# Patient Record
Sex: FEMALE | Race: WHITE | HISPANIC OR LATINO | Employment: OTHER | URBAN - METROPOLITAN AREA
[De-identification: names, ages, dates, MRNs, and addresses within clinical notes are randomized per-mention and may not be internally consistent; named-entity substitution may affect disease eponyms.]

---

## 2017-05-11 ENCOUNTER — OFFICE VISIT (OUTPATIENT)
Dept: MEDICAL GROUP | Age: 57
End: 2017-05-11
Payer: COMMERCIAL

## 2017-05-11 VITALS
DIASTOLIC BLOOD PRESSURE: 86 MMHG | WEIGHT: 139 LBS | OXYGEN SATURATION: 97 % | BODY MASS INDEX: 23.73 KG/M2 | TEMPERATURE: 98.1 F | HEART RATE: 69 BPM | HEIGHT: 64 IN | SYSTOLIC BLOOD PRESSURE: 122 MMHG

## 2017-05-11 DIAGNOSIS — G44.209 TENSION HEADACHE: ICD-10-CM

## 2017-05-11 DIAGNOSIS — M54.50 ACUTE BILATERAL LOW BACK PAIN WITHOUT SCIATICA: ICD-10-CM
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
## 2017-05-11 DIAGNOSIS — M62.838 NECK MUSCLE SPASM: ICD-10-CM

## 2017-05-11 PROCEDURE — 99214 OFFICE O/P EST MOD 30 MIN: CPT | Mod: 25 | Performed by: PHYSICIAN ASSISTANT

## 2017-05-11 RX ORDER — NAPROXEN 500 MG/1
500 TABLET ORAL 2 TIMES DAILY WITH MEALS
Qty: 60 TAB | Refills: 0 | Status: SHIPPED | OUTPATIENT
Start: 2017-05-11 | End: 2017-06-27 | Stop reason: SDUPTHER

## 2017-05-11 RX ORDER — KETOROLAC TROMETHAMINE 30 MG/ML
30 INJECTION, SOLUTION INTRAMUSCULAR; INTRAVENOUS ONCE
Status: COMPLETED | OUTPATIENT
Start: 2017-05-11 | End: 2017-05-11

## 2017-05-11 RX ORDER — CYCLOBENZAPRINE HCL 5 MG
5-10 TABLET ORAL 3 TIMES DAILY PRN
Qty: 30 TAB | Refills: 0 | Status: SHIPPED | OUTPATIENT
Start: 2017-05-11 | End: 2017-06-27 | Stop reason: SDUPTHER

## 2017-05-11 RX ADMIN — KETOROLAC TROMETHAMINE 30 MG: 30 INJECTION, SOLUTION INTRAMUSCULAR; INTRAVENOUS at 09:46

## 2017-05-11 ASSESSMENT — ENCOUNTER SYMPTOMS
NECK PAIN: 1
BACK PAIN: 1
HEADACHES: 1

## 2017-05-11 ASSESSMENT — PATIENT HEALTH QUESTIONNAIRE - PHQ9: CLINICAL INTERPRETATION OF PHQ2 SCORE: 1

## 2017-05-11 NOTE — Clinical Note
[facility]  Omaira Gonsalves PA-C  [address]  [place] [address]  Fax: [phone]   Authorization for Release/Disclosure of   Protected Health Information   Name: JOVITA TRENT : 1960 SSN: [id number]   Address: [address] [place] [address] Phone:    [phone] (home)    I authorize the entity listed below to release/disclose the PHI below to:   [facility]/Omaira Gonsalves PA-C and Omaira Gonsalves PA-C   Provider or Entity Name:  [facility], [facility]   Address   City, State, [facility]   Phone:      Fax:  [phone]     Reason for request: continuity of care   Information to be released:    [  x] LAST COLONOSCOPY,  including any PATH REPORT and follow-up  [  ] LAST FIT/COLOGUARD RESULT [  ] LAST DEXA  [  ] LAST MAMMOGRAM  [  ] LAST PAP  [  ] LAST LABS [  ] RETINA EXAM REPORT  [  ] IMMUNIZATION RECORDS  [  ] Release all info      [  ] Check here and initial the line next to each item to release ALL health information INCLUDING  _____ Care and treatment for drug and / or alcohol abuse  _____ HIV testing, infection status, or AIDS  _____ Genetic Testing    DATES OF SERVICE OR TIME PERIOD TO BE DISCLOSED: _____________  I understand and acknowledge that:  * This Authorization may be revoked at any time by you in writing, except if your health information has already been used or disclosed.  * Your health information that will be used or disclosed as a result of you signing this authorization could be re-disclosed by the recipient. If this occurs, your re-disclosed health information may no longer be protected by State or Federal laws.  * You may refuse to sign this Authorization. Your refusal will not affect your ability to obtain treatment.  * This Authorization becomes effective upon signing and will  on (date) __________.      If no date is indicated, this Authorization will  one (1) year from the signature date.    Name: Jovita Trent    Signature:   Date:     2017       PLEASE FAX REQUESTED RECORDS  BACK TO: [phone]
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
## 2017-05-23 DIAGNOSIS — Z13.0 SCREENING FOR ENDOCRINE, METABOLIC AND IMMUNITY DISORDER: ICD-10-CM

## 2017-05-23 DIAGNOSIS — Z13.220 LIPID SCREENING: ICD-10-CM

## 2017-05-23 DIAGNOSIS — J44.9 CHRONIC OBSTRUCTIVE PULMONARY DISEASE, UNSPECIFIED COPD TYPE (HCC): ICD-10-CM

## 2017-05-23 DIAGNOSIS — Z12.31 ENCOUNTER FOR SCREENING MAMMOGRAM FOR BREAST CANCER: ICD-10-CM

## 2017-05-23 DIAGNOSIS — Z13.29 SCREENING FOR ENDOCRINE, METABOLIC AND IMMUNITY DISORDER: ICD-10-CM

## 2017-05-23 DIAGNOSIS — Z13.1 SCREENING FOR DIABETES MELLITUS: ICD-10-CM

## 2017-05-23 DIAGNOSIS — Z13.228 SCREENING FOR ENDOCRINE, METABOLIC AND IMMUNITY DISORDER: ICD-10-CM

## 2017-06-27 ENCOUNTER — OFFICE VISIT (OUTPATIENT)
Dept: MEDICAL GROUP | Age: 57
End: 2017-06-27
Payer: COMMERCIAL

## 2017-06-27 VITALS
SYSTOLIC BLOOD PRESSURE: 112 MMHG | TEMPERATURE: 97.7 F | HEIGHT: 64 IN | BODY MASS INDEX: 23.32 KG/M2 | WEIGHT: 136.6 LBS | HEART RATE: 67 BPM | OXYGEN SATURATION: 98 % | DIASTOLIC BLOOD PRESSURE: 62 MMHG

## 2017-06-27 DIAGNOSIS — M62.838 NECK MUSCLE SPASM: ICD-10-CM

## 2017-06-27 DIAGNOSIS — M54.50 ACUTE BILATERAL LOW BACK PAIN WITHOUT SCIATICA: ICD-10-CM

## 2017-06-27 DIAGNOSIS — G44.209 TENSION HEADACHE: ICD-10-CM
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
## 2017-06-27 PROCEDURE — 99214 OFFICE O/P EST MOD 30 MIN: CPT | Performed by: PHYSICIAN ASSISTANT

## 2017-06-27 RX ORDER — NAPROXEN 500 MG/1
500 TABLET ORAL 2 TIMES DAILY WITH MEALS
Qty: 60 TAB | Refills: 0 | Status: SHIPPED | OUTPATIENT
Start: 2017-06-27 | End: 2018-03-19

## 2017-06-27 RX ORDER — CYCLOBENZAPRINE HCL 5 MG
5-10 TABLET ORAL 3 TIMES DAILY PRN
Qty: 30 TAB | Refills: 0 | Status: SHIPPED | OUTPATIENT
Start: 2017-06-27 | End: 2018-03-19
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
## 2017-07-10 ENCOUNTER — OFFICE VISIT (OUTPATIENT)
Dept: MEDICAL GROUP | Age: 57
End: 2017-07-10
Payer: COMMERCIAL

## 2017-07-10 VITALS
HEART RATE: 70 BPM | DIASTOLIC BLOOD PRESSURE: 80 MMHG | BODY MASS INDEX: 23.49 KG/M2 | TEMPERATURE: 98.3 F | SYSTOLIC BLOOD PRESSURE: 120 MMHG | WEIGHT: 137.6 LBS | OXYGEN SATURATION: 96 % | HEIGHT: 64 IN

## 2017-07-10 DIAGNOSIS — E78.5 DYSLIPIDEMIA: ICD-10-CM

## 2017-07-10 DIAGNOSIS — Z23 NEED FOR VACCINATION: ICD-10-CM

## 2017-07-10 DIAGNOSIS — J44.9 CHRONIC OBSTRUCTIVE PULMONARY DISEASE, UNSPECIFIED COPD TYPE (HCC): ICD-10-CM

## 2017-07-10 PROCEDURE — 90715 TDAP VACCINE 7 YRS/> IM: CPT | Performed by: PHYSICIAN ASSISTANT

## 2017-07-10 PROCEDURE — 99214 OFFICE O/P EST MOD 30 MIN: CPT | Mod: 25 | Performed by: PHYSICIAN ASSISTANT

## 2017-07-10 PROCEDURE — 90732 PPSV23 VACC 2 YRS+ SUBQ/IM: CPT | Performed by: PHYSICIAN ASSISTANT

## 2017-07-10 PROCEDURE — 90471 IMMUNIZATION ADMIN: CPT | Performed by: PHYSICIAN ASSISTANT

## 2017-07-10 PROCEDURE — 90472 IMMUNIZATION ADMIN EACH ADD: CPT | Performed by: PHYSICIAN ASSISTANT

## 2017-07-10 NOTE — PROGRESS NOTES
Subjective:     Chief Complaint   Patient presents with   • Hyperlipidemia     Results for blood work     Rylie Trent is a 57 y.o. female here today for evaluation and management of:    Chronic obstructive pulmonary disease, unspecified COPD type (CMS-HCC)  Stable. Not currently using albuterol.   She denies side effects with its use in the past.  Denies recent or current exacerbation.   Denies current shortness of breath, chest pain, or cough.  She is not on oxygen therapy.  Last PFT: 2015-- suggestive of mild obstructive disease    Dyslipidemia  Reports diet is fair but needs more veggies. Reports high in carbs.  She isexercising regularly-walking a few days a week. Plans to increase  She is not taking ASA daily.  She denies dizziness, claudication, or chest pain.    Vaginal dryness--reports resolved. Under care of GYN.      ROS   Denies any recent fevers or chills. No nausea or vomiting. No diarrhea. No chest pains or shortness of breath. No lower extremity edema.    No Known Allergies    Current medicines (including changes today)  Current Outpatient Prescriptions   Medication Sig Dispense Refill   • naproxen (NAPROSYN) 500 MG Tab Take 1 Tab by mouth 2 times a day, with meals. 60 Tab 0   • Acetaminophen (TYLENOL ARTHRITIS PAIN PO) Take  by mouth.     • CALCIUM PO Take 600 mg by mouth every day.     • albuterol (PROAIR HFA) 108 (90 BASE) MCG/ACT AERS inhalation aerosol Inhale 2 Puffs by mouth every four hours as needed for Shortness of Breath. 1 Inhaler 0   • Multiple Vitamins-Minerals (HAIR/SKIN/NAILS PO) Take 1 Cap by mouth every day.     • Multiple Vitamins-Minerals (MULTIVITAL PO) Take  by mouth.     • ascorbic acid (SM VIT C/BARBARA HIPS) 1000 MG tablet Take 1 Tab by mouth every day.     • vitamin E (VITAMIN E) 1000 UNIT CAPS Take 1 Cap by mouth every day. For skin      • cyclobenzaprine (FLEXERIL) 5 MG tablet Take 1-2 Tabs by mouth 3 times a day as needed for Muscle Spasms. (Patient not taking: Reported on  "7/10/2017) 30 Tab 0   • GARLIC Take 1,000 Caps by mouth every day.       No current facility-administered medications for this visit.       Patient Active Problem List    Diagnosis Date Noted   • Dyslipidemia 07/10/2017   • Chronic obstructive lung disease (CMS-HCC) 07/15/2015   • Menopausal vaginal dryness 03/18/2015   • Perimenopausal symptoms 07/17/2012       Family History   Problem Relation Age of Onset   • Cancer Mother      pancreatic   • Cancer Father      liver   • Diabetes Neg Hx    • Hypertension Neg Hx    • Heart Disease Neg Hx    • Stroke Neg Hx    • Lung Disease Paternal Grandmother      asthma          Objective:     Blood pressure 120/80, pulse 70, temperature 36.8 °C (98.3 °F), height 1.626 m (5' 4\"), weight 62.415 kg (137 lb 9.6 oz), SpO2 96 %. Body mass index is 23.61 kg/(m^2).     Physical Exam:  Gen: Well developed, well nourished in no acute distress.   Skin: Pink, warm, and dry  HEENT: conjunctiva non-injected, sclera non-icteric. EOMs intact.   Oral mucous membranes pink and moist with no lesions.  Neck: Supple, trachea midline. No adenopathy or masses in the neck or supraclavicular regions.  Lungs: Effort is normal. Clear to auscultation bilaterally with good excursion.  CV: regular rate and rhythm.  Abdomen: soft, nontender, + BS. No HSM.   Ext: no edema, color normal, vascularity normal, temperature normal  Alert and oriented Eye contact is good, speech goal directed, affect calm    [facility] labs scanned into media from 5/25/17 reviewed with patient:  Glucose 93  Total cholesterol 232; Triglycerides 114; HDL 45;   TSH 1.30  Reviewed and discussed above labs with patient in office.      Assessment and Plan:   The following treatment plan was discussed:     1. Chronic obstructive pulmonary disease, unspecified COPD type (CMS-HCC) - stable. She will start albuterol use as needed for dyspnea symptoms. If using more than once weekly, she will let me know and we will consider adding spiriva. " Will consider PFT in future, however, will hold off at this time due to patient's concerns with cost.  CBC WITH DIFFERENTIAL   2. Dyslipidemia - uncontrolled. Pt not interested in prescription therapy at this time. Will work on diet modification.  is taking cholestoff and fish oil currently and would like to also try. We will recheck labs in 6 months. No family hx of CV disease.  COMP METABOLIC PANEL    LIPID PROFILE   3. Need for vaccination -VIS' given. Informed consent obtained. Vaccines administered in office. TDAP VACCINE =>6YO IM    Pneumococal Polysaccharide Vaccine 23-Valent =>1yo SQ/IM     - HM: Reminded to complete mammo.   -Any change or worsening of signs or symptoms, patient encouraged to follow-up or report to emergency room for further evaluation. Patient verbalizes understanding and agrees.    Followup: Return in about 6 months (around 1/10/2018) for lab review. Sooner if needed.          Patient was seen for 25 minutes face to face of which > 50% of appointment time was spent on counseling and coordination of care regarding the above.

## 2017-07-10 NOTE — MR AVS SNAPSHOT
"        Rylie Scales   7/10/2017 1:50 PM   Office Visit   MRN: [id number]    Department:  [address] [place] [address]   Dept Phone:  [phone]    Description:  Female : 1960   Provider:  Omaiar Gonsalves PA-C           Reason for Visit     Hyperlipidemia Results for blood work      Allergies as of 7/10/2017     No Known Allergies      You were diagnosed with     Chronic obstructive pulmonary disease, unspecified COPD type (CMS-HCC)   [0049518]       Dyslipidemia   [762098]       Need for vaccination   [020444]         Vital Signs     Blood Pressure Pulse Temperature Height Weight Body Mass Index    120/80 mmHg 70 36.8 °C (98.3 °F) 1.626 m (5' 4\") 62.415 kg (137 lb 9.6 oz) 23.61 kg/m2    Oxygen Saturation Smoking Status                96% Never Smoker           Basic Information     Date Of Birth Sex Race Ethnicity Preferred Language    1960 Female White Non- English      Your appointments     Say 15, 2018  1:10 PM   Established Patient with Omaira Gonsalves PA-C   [address] [facility]  [facility] [id number] [phone]           You will be receiving a confirmation call a few days before your appointment from our automated call confirmation system.              Problem List              ICD-10-CM Priority Class Noted - Resolved    Perimenopausal symptoms N95.1   2012 - Present    Menopausal vaginal dryness N95.1   3/18/2015 - Present    Chronic obstructive lung disease (CMS-HCC) J44.9   7/15/2015 - Present    Dyslipidemia E78.5   7/10/2017 - Present      Health Maintenance        Date Due Completion Dates    IMM DTaP/Tdap/Td Vaccine (1 - Tdap) 6/10/1978 ---    IMM PNEUMOCOCCAL 19-64 (ADULT) MEDIUM RISK SERIES (1 of 1 - PPSV23) 6/10/1978 ---    MAMMOGRAM 2015, 2007, 2007, 2005    PAP SMEAR 2017, 7/15/2011, 2010    IMM INFLUENZA (1) 2016, 2015    COLONOSCOPY 2026            "   Current Immunizations     Influenza Vaccine Quad Inj (Pf) 11/14/2016  3:38 PM, 11/9/2015  1:20 PM    Pneumococcal polysaccharide vaccine (PPSV-23)  Incomplete    Tdap Vaccine  Incomplete      Below and/or attached are the medications your provider expects you to take. Review all of your home medications and newly ordered medications with your provider and/or pharmacist. Follow medication instructions as directed by your provider and/or pharmacist. Please keep your medication list with you and share with your provider. Update the information when medications are discontinued, doses are changed, or new medications (including over-the-counter products) are added; and carry medication information at all times in the event of emergency situations     Allergies:  No Known Allergies          Medications  Valid as of: July 10, 2017 -  2:50 PM    Generic Name Brand Name Tablet Size Instructions for use    Acetaminophen   Take  by mouth.        Albuterol Sulfate (Aero Soln) albuterol 108 (90 BASE) MCG/ACT Inhale 2 Puffs by mouth every four hours as needed for Shortness of Breath.        Ascorbic Acid (Tab) VITAMIN C 1000 MG Take 1 Tab by mouth every day.        Calcium   Take 600 mg by mouth every day.        Cyclobenzaprine HCl (Tab) FLEXERIL 5 MG Take 1-2 Tabs by mouth 3 times a day as needed for Muscle Spasms.        Garlic   Take 1,000 Caps by mouth every day.        Multiple Vitamins-Minerals   Take  by mouth.        Multiple Vitamins-Minerals   Take 1 Cap by mouth every day.        Naproxen (Tab) NAPROSYN 500 MG Take 1 Tab by mouth 2 times a day, with meals.        Vitamin E (Cap) VITAMIN E 1000 UNIT Take 1 Cap by mouth every day. For skin         .                 Medicines prescribed today were sent to:     [facility] [address] [place] [address] - [address] [place] [id number]    Phone: [phone] Fax: [phone]    Open 24 Hours?: No      Medication refill instructions:       If  your prescription bottle indicates you have medication refills left, it is not necessary to call your provider’s office. Please contact your pharmacy and they will refill your medication.    If your prescription bottle indicates you do not have any refills left, you may request refills at any time through one of the following ways: The online Smart[employer] system (except Urgent Care), by calling your provider’s office, or by asking your pharmacy to contact your provider’s office with a refill request. Medication refills are processed only during regular business hours and may not be available until the next business day. Your provider may request additional information or to have a follow-up visit with you prior to refilling your medication.   *Please Note: Medication refills are assigned a new Rx number when refilled electronically. Your pharmacy may indicate that no refills were authorized even though a new prescription for the same medication is available at the pharmacy. Please request the medicine by name with the pharmacy before contacting your provider for a refill.        Your To Do List     Future Labs/Procedures Complete By Expires    CBC WITH DIFFERENTIAL  As directed 7/11/2018    COMP METABOLIC PANEL  As directed 7/11/2018    LIPID PROFILE  As directed 7/11/2018         Smarty Ring Access Code: Activation code not generated  Current Smarty Ring Status: Active

## 2017-07-31 ENCOUNTER — TELEPHONE (OUTPATIENT)
Dept: MEDICAL GROUP | Age: 57
End: 2017-07-31

## 2017-08-01 NOTE — TELEPHONE ENCOUNTER
Phone Number Called: [facility] [phone]    Message: I received a message from Cate CARO Stating that [facility] needs an updated referral. I have called and left a message with [facility] to get more information.    Left Message for patient to call back: yes

## 2018-01-15 ENCOUNTER — OFFICE VISIT (OUTPATIENT)
Dept: MEDICAL GROUP | Age: 58
End: 2018-01-15
Payer: COMMERCIAL

## 2018-01-15 VITALS
SYSTOLIC BLOOD PRESSURE: 122 MMHG | WEIGHT: 143 LBS | TEMPERATURE: 98.3 F | OXYGEN SATURATION: 97 % | DIASTOLIC BLOOD PRESSURE: 72 MMHG | HEART RATE: 82 BPM | HEIGHT: 64 IN | BODY MASS INDEX: 24.41 KG/M2

## 2018-01-15 DIAGNOSIS — Z23 NEED FOR VACCINATION: ICD-10-CM

## 2018-01-15 DIAGNOSIS — E78.5 DYSLIPIDEMIA: ICD-10-CM

## 2018-01-15 PROCEDURE — 99213 OFFICE O/P EST LOW 20 MIN: CPT | Mod: 25 | Performed by: PHYSICIAN ASSISTANT

## 2018-01-15 PROCEDURE — 90686 IIV4 VACC NO PRSV 0.5 ML IM: CPT | Performed by: PHYSICIAN ASSISTANT

## 2018-01-15 PROCEDURE — 90471 IMMUNIZATION ADMIN: CPT | Performed by: PHYSICIAN ASSISTANT

## 2018-01-15 RX ORDER — ATORVASTATIN CALCIUM 20 MG/1
20 TABLET, FILM COATED ORAL
Qty: 90 TAB | Refills: 1 | Status: SHIPPED | OUTPATIENT
Start: 2018-01-15 | End: 2018-10-30 | Stop reason: SDUPTHER

## 2018-01-15 NOTE — LETTER
[facility]  Omaira Gonsalves P.A.-C.  [address]  [place] [address]  Fax: [phone]   Authorization for Release/Disclosure of   Protected Health Information   Name: JOVITA TRENT : 1960 SSN: [id number]   Address: [facility] [place] [address] Phone:    [phone] (home)    I authorize the entity listed below to release/disclose the PHI below to:   [facility]/Omaira Gonsalves P.A.-C. and Omaira Gonsalves P.A.-C.   Provider or Entity Name:  Mike Hearn M.D.   Address   City, State, Zip   Phone:  [phone]    Fax:  [phone]   Reason for request: continuity of care   Information to be released:    [  ] LAST COLONOSCOPY,  including any PATH REPORT and follow-up  [  ] LAST FIT/COLOGUARD RESULT [  ] LAST DEXA  [  ] LAST MAMMOGRAM  [ xxx ] LAST PAP  [  ] LAST LABS [  ] RETINA EXAM REPORT  [  ] IMMUNIZATION RECORDS  [  ] Release all info      [  ] Check here and initial the line next to each item to release ALL health information INCLUDING  _____ Care and treatment for drug and / or alcohol abuse  _____ HIV testing, infection status, or AIDS  _____ Genetic Testing    DATES OF SERVICE OR TIME PERIOD TO BE DISCLOSED: _____________  I understand and acknowledge that:  * This Authorization may be revoked at any time by you in writing, except if your health information has already been used or disclosed.  * Your health information that will be used or disclosed as a result of you signing this authorization could be re-disclosed by the recipient. If this occurs, your re-disclosed health information may no longer be protected by State or Federal laws.  * You may refuse to sign this Authorization. Your refusal will not affect your ability to obtain treatment.  * This Authorization becomes effective upon signing and will  on (date) __________.      If no date is indicated, this Authorization will  one (1) year from the signature date.    Name: Jovita Trent    Signature:   Date:          1/15/2018       PLEASE FAX REQUESTED RECORDS BACK TO: [phone]

## 2018-01-20 NOTE — PROGRESS NOTES
Subjective:     Chief Complaint   Patient presents with   • COPD   • Results     Rylie Trent is a 57 y.o. female here today for evaluation and management of:    Dyslipidemia  Stable. Currently taking cholestoff over the counter as directed.  Denies side effects--no myalgias or abdominal pain.  Reports diet is fair to poor.  She is not exercising regularly.  She is not taking ASA daily.  She denies dizziness, claudication, or chest pain.      ROS   Denies any recent fevers or chills. No nausea or vomiting. No diarrhea. No chest pains or shortness of breath. No lower extremity edema.    No Known Allergies    Current medicines (including changes today)  Current Outpatient Prescriptions   Medication Sig Dispense Refill   • Nutritional Supplements (WOMENS HEALTH SUPPORT PO) Take  by mouth.     • atorvastatin (LIPITOR) 20 MG Tab Take 1 Tab by mouth every bedtime. 90 Tab 1   • aspirin EC (ECOTRIN) 81 MG Tablet Delayed Response Take 1 Tab by mouth every day. 30 Tab    • cyclobenzaprine (FLEXERIL) 5 MG tablet Take 1-2 Tabs by mouth 3 times a day as needed for Muscle Spasms. 30 Tab 0   • Acetaminophen (TYLENOL ARTHRITIS PAIN PO) Take  by mouth.     • CALCIUM PO Take 600 mg by mouth every day.     • albuterol (PROAIR HFA) 108 (90 BASE) MCG/ACT AERS inhalation aerosol Inhale 2 Puffs by mouth every four hours as needed for Shortness of Breath. 1 Inhaler 0   • Multiple Vitamins-Minerals (MULTIVITAL PO) Take  by mouth.     • naproxen (NAPROSYN) 500 MG Tab Take 1 Tab by mouth 2 times a day, with meals. (Patient not taking: Reported on 1/15/2018) 60 Tab 0   • Multiple Vitamins-Minerals (HAIR/SKIN/NAILS PO) Take 1 Cap by mouth every day.     • GARLIC Take 1,000 Caps by mouth every day.     • ascorbic acid (SM VIT C/BARBARA HIPS) 1000 MG tablet Take 1 Tab by mouth every day.     • vitamin E (VITAMIN E) 1000 UNIT CAPS Take 1 Cap by mouth every day. For skin        No current facility-administered medications for this visit.   "      Patient Active Problem List    Diagnosis Date Noted   • Dyslipidemia 07/10/2017   • Chronic obstructive lung disease (CMS-HCC) 07/15/2015   • Perimenopausal symptoms 07/17/2012       Family History   Problem Relation Age of Onset   • Cancer Mother      pancreatic   • Cancer Father      liver   • Lung Disease Paternal Grandmother      asthma   • Diabetes Neg Hx    • Hypertension Neg Hx    • Heart Disease Neg Hx    • Stroke Neg Hx           Objective:     Vitals:    01/15/18 1316   BP: 122/72   Pulse: 82   Temp: 36.8 °C (98.3 °F)   SpO2: 97%   Weight: 64.9 kg (143 lb)   Height: 1.619 m (5' 3.75\")     Body mass index is 24.74 kg/m².     Physical Exam:  Gen: Well developed, well nourished in no acute distress.   Skin: Pink, warm, and dry  HEENT: conjunctiva non-injected, sclera non-icteric. EOMs intact.   Neck: Supple, trachea midline. No adenopathy or masses in the neck or supraclavicular regions. No carotid bruits.  Lungs: Effort is normal. Clear to auscultation bilaterally with good excursion.  CV: regular rate and rhythm.  Abdomen: soft, nontender, + BS. No HSM.  Ext: no edema, color normal, vascularity normal, temperature normal  Alert and oriented Eye contact is good, speech goal directed, affect calm    [facility] labs scanned into media from 1/2/18  Total  Cholesterol 251; TGs 161; HDL 57;   Discussed with patient in office      Assessment and Plan:   The following treatment plan was discussed:     1. Dyslipidemia - uncontrolled. Rx: Lipitor 20 mg nightly. Discussed potential side effects of medication with patient.  Recheck labs in 6 weeks. Advised to start taking 81 mg of ASA daily.    atorvastatin (LIPITOR) 20 MG Tab    COMP METABOLIC PANEL    LIPID PROFILE    aspirin EC (ECOTRIN) 81 MG Tablet Delayed Response   2. Need for vaccination -VIS given. Informed consent obtained. Vaccine administered in office. INFLUENZA VACCINE QUAD INJ >3Y(PF)       -Any change or worsening of signs or symptoms, patient " encouraged to follow-up or report to emergency room for further evaluation. Patient verbalizes understanding and agrees.    Followup: Return in about 2 months (around 3/15/2018) for lab review and follow-up on lipitor. sooner if needed

## 2018-02-08 ENCOUNTER — TELEPHONE (OUTPATIENT)
Dept: MEDICAL GROUP | Age: 58
End: 2018-02-08

## 2018-02-08 NOTE — TELEPHONE ENCOUNTER
----- Message from Omaira Gonsalves P.A.-C. sent at 1/20/2018  6:14 AM PST -----  Obtain last pap from 2016.

## 2018-02-08 NOTE — TELEPHONE ENCOUNTER
Phone Number Called: Dr. Hearn    Message: Office does not have any pap smear results for 2016. Last one was from 2015.    Left Message for patient to call back: no

## 2018-03-19 ENCOUNTER — OFFICE VISIT (OUTPATIENT)
Dept: MEDICAL GROUP | Age: 58
End: 2018-03-19
Payer: COMMERCIAL

## 2018-03-19 VITALS
HEIGHT: 64 IN | BODY MASS INDEX: 23.73 KG/M2 | HEART RATE: 75 BPM | DIASTOLIC BLOOD PRESSURE: 76 MMHG | WEIGHT: 139 LBS | RESPIRATION RATE: 16 BRPM | OXYGEN SATURATION: 95 % | TEMPERATURE: 98.2 F | SYSTOLIC BLOOD PRESSURE: 120 MMHG

## 2018-03-19 DIAGNOSIS — E78.5 DYSLIPIDEMIA: ICD-10-CM

## 2018-03-19 DIAGNOSIS — R59.1 LYMPHADENOPATHY: ICD-10-CM

## 2018-03-19 PROCEDURE — 99214 OFFICE O/P EST MOD 30 MIN: CPT | Performed by: PHYSICIAN ASSISTANT

## 2018-03-19 ASSESSMENT — PAIN SCALES - GENERAL: PAINLEVEL: NO PAIN

## 2018-03-19 NOTE — PROGRESS NOTES
Subjective:     Chief Complaint   Patient presents with   • Follow-Up     dyslipidema      Rylie Trent is a 57 y.o. female here today for evaluation and management of:    Dyslipidemia  Stable. Currently taking lipitor 20 mg nightly as directed.  Denies side effects--no myalgias or abdominal pain.  Reports diet is has improved and is avoiding breads, pastas and races.  She is exercising regularly through nightly walk with her .  She is taking ASA daily.  She denies dizziness, claudication, or chest pain.      Lymphadenopathy  New problem.  Reports about 2 weeks ago her friend noticed some mass on her chest.  Denies any changes in the size of the mass.  It is not tender.  No fever, chills, bodyaches night sweats or unexplained weight loss.  She feels well and without any recent illnesses.    ROS   Denies any recent fevers or chills. No nausea or vomiting. No diarrhea. No chest pains or shortness of breath. No lower extremity edema.    No Known Allergies    Current medicines (including changes today)  Current Outpatient Prescriptions   Medication Sig Dispense Refill   • Nutritional Supplements (WOMENS HEALTH SUPPORT PO) Take  by mouth.     • atorvastatin (LIPITOR) 20 MG Tab Take 1 Tab by mouth every bedtime. 90 Tab 1   • aspirin EC (ECOTRIN) 81 MG Tablet Delayed Response Take 1 Tab by mouth every day. 30 Tab    • Acetaminophen (TYLENOL ARTHRITIS PAIN PO) Take  by mouth.     • CALCIUM PO Take 600 mg by mouth every day.     • albuterol (PROAIR HFA) 108 (90 BASE) MCG/ACT AERS inhalation aerosol Inhale 2 Puffs by mouth every four hours as needed for Shortness of Breath. 1 Inhaler 0   • Multiple Vitamins-Minerals (HAIR/SKIN/NAILS PO) Take 1 Cap by mouth every day.     • Multiple Vitamins-Minerals (MULTIVITAL PO) Take  by mouth.     • GARLIC Take 1,000 Caps by mouth every day.     • ascorbic acid (SM VIT C/BARBARA HIPS) 1000 MG tablet Take 1 Tab by mouth every day.     • vitamin E (VITAMIN E) 1000 UNIT CAPS Take 1 Cap  "by mouth every day. For skin        No current facility-administered medications for this visit.        Patient Active Problem List    Diagnosis Date Noted   • Dyslipidemia 07/10/2017   • Perimenopausal symptoms 07/17/2012       Family History   Problem Relation Age of Onset   • Cancer Mother      pancreatic   • Cancer Father      liver   • Lung Disease Paternal Grandmother      asthma   • Diabetes Neg Hx    • Hypertension Neg Hx    • Heart Disease Neg Hx    • Stroke Neg Hx           Objective:     Vitals:    03/19/18 1300   BP: 120/76   Pulse: 75   Resp: 16   Temp: 36.8 °C (98.2 °F)   SpO2: 95%   Weight: 63 kg (139 lb)   Height: 1.619 m (5' 3.75\")     Body mass index is 24.05 kg/m².     Physical Exam:  Gen: Well developed, well nourished in no acute distress.   Skin: Pink, warm, and dry  HEENT: conjunctiva non-injected, sclera non-icteric. EOMs intact.   Nasal mucosa without edema nor erythema. No facial tenderness  Pinna normal. TM pearly gray.   Oral mucous membranes pink and moist with no lesions.  Neck: Supple, trachea midline. No adenopathy or masses in the neck.  Soft, mobile 5 mm diameter subcutaneous mass left supraclavicular region.  Lungs: Effort is normal. Clear to auscultation bilaterally with good excursion.  CV: regular rate and rhythm.  Abdomen: soft, nontender, + BS. No HSM.    Ext: no edema, color normal, vascularity normal, temperature normal  Alert and oriented Eye contact is good, speech goal directed, affect calm    Reviewed [facility] labs from 2/23/18 scanned into media with patient  Total cholesterol 132; HDL 56; triglycerides 70;       Assessment and Plan:   The following treatment plan was discussed:     1. Dyslipidemia -Stable. Continue current medicines. Monitor labs regularly-- 6mos COMP METABOLIC PANEL    LIPID PROFILE   2. Lymphadenopathy - US ordered for further evaluation. CBC also ordered.  Instructed her to not touch it and reassess in 1 week. US-SOFT TISSUES OF HEAD - NECK    CBC WITH " DIFFERENTIAL     - HM: Health maintenance is up-to-date  -Any change or worsening of signs or symptoms, patient encouraged to follow-up or report to emergency room for further evaluation. Patient verbalizes understanding and agrees.    Followup: Return in about 6 months (around 9/19/2018) for lab review, sooner if needed.         This dictation was created using voice recognition software. The accuracy of the dictation is limited to the abilities of the software. I expect there may be some errors of grammar and possibly content.

## 2018-04-02 ENCOUNTER — HOSPITAL ENCOUNTER (OUTPATIENT)
Dept: RADIOLOGY | Facility: MEDICAL CENTER | Age: 58
End: 2018-04-02
Attending: PHYSICIAN ASSISTANT
Payer: COMMERCIAL

## 2018-04-02 DIAGNOSIS — R59.1 LYMPHADENOPATHY: ICD-10-CM

## 2018-04-02 PROCEDURE — 76536 US EXAM OF HEAD AND NECK: CPT

## 2018-09-11 DIAGNOSIS — E78.5 DYSLIPIDEMIA: ICD-10-CM

## 2018-09-11 DIAGNOSIS — R59.1 LYMPHADENOPATHY: ICD-10-CM

## 2018-09-17 ENCOUNTER — TELEPHONE (OUTPATIENT)
Dept: MEDICAL GROUP | Age: 58
End: 2018-09-17

## 2018-09-17 DIAGNOSIS — E78.5 DYSLIPIDEMIA: ICD-10-CM

## 2018-09-17 NOTE — TELEPHONE ENCOUNTER
Pt no showed her appointment. Please let her know that I am pleased with her labs and OK with delaying her visit until I am back from my maternity leave. I have ordered fasting labs for her to do prior to that appointment.

## 2018-09-17 NOTE — TELEPHONE ENCOUNTER
----- Message from Omaira Gonsalves P.A.-C. sent at 9/11/2018  2:21 PM PDT -----      ----- Message -----  From: Araceli Cam  Sent: 9/11/2018  10:52 AM  To: Omaira Gonsalves P.A.-C.

## 2018-09-18 NOTE — TELEPHONE ENCOUNTER
Phone Number Called: [phone] (home)     Message: Left message for the patient to call us back regarding the note below.    Left Message for patient to call back: yes

## 2018-09-20 NOTE — TELEPHONE ENCOUNTER
Phone Number Called: [phone] (home)       Message: LVM for patient to call us back regarding the note below.    Left Message for patient to call back: yes

## 2018-10-30 ENCOUNTER — OFFICE VISIT (OUTPATIENT)
Dept: MEDICAL GROUP | Age: 58
End: 2018-10-30
Payer: COMMERCIAL

## 2018-10-30 VITALS
WEIGHT: 135.6 LBS | HEIGHT: 64 IN | SYSTOLIC BLOOD PRESSURE: 118 MMHG | HEART RATE: 69 BPM | BODY MASS INDEX: 23.15 KG/M2 | DIASTOLIC BLOOD PRESSURE: 80 MMHG | OXYGEN SATURATION: 97 % | TEMPERATURE: 97.2 F

## 2018-10-30 DIAGNOSIS — E78.5 DYSLIPIDEMIA: ICD-10-CM

## 2018-10-30 DIAGNOSIS — Z23 NEED FOR VACCINATION: ICD-10-CM

## 2018-10-30 DIAGNOSIS — R06.02 SHORTNESS OF BREATH: ICD-10-CM

## 2018-10-30 DIAGNOSIS — Z00.00 WELL ADULT EXAM: ICD-10-CM

## 2018-10-30 PROCEDURE — 99396 PREV VISIT EST AGE 40-64: CPT | Performed by: PHYSICIAN ASSISTANT

## 2018-10-30 PROCEDURE — 90471 IMMUNIZATION ADMIN: CPT | Performed by: PHYSICIAN ASSISTANT

## 2018-10-30 PROCEDURE — 90686 IIV4 VACC NO PRSV 0.5 ML IM: CPT | Performed by: PHYSICIAN ASSISTANT

## 2018-10-30 RX ORDER — ATORVASTATIN CALCIUM 20 MG/1
20 TABLET, FILM COATED ORAL
Qty: 90 TAB | Refills: 1 | Status: SHIPPED | OUTPATIENT
Start: 2018-10-30 | End: 2019-03-19 | Stop reason: SDUPTHER

## 2018-10-30 ASSESSMENT — PATIENT HEALTH QUESTIONNAIRE - PHQ9: CLINICAL INTERPRETATION OF PHQ2 SCORE: 0

## 2018-11-02 NOTE — PROGRESS NOTES
Subjective:     CC:   Chief Complaint   Patient presents with   • Hyperlipidemia     Annual       HPI:   Rylie Trent is a 58 y.o. female who presents for annual exam    Pt has GYN provider: yes  Last pap: 2015   Last mammo:  11/13/17  Last colonoscopy: 2016- normal.  Last Tdap: 2017    Regular exercise: no   Diet: relatively clean    She  has a past medical history of Anemia; Arthritis; ASTHMA; Blood transfusion (1988); and Mild intermittent asthma without complication (7/15/2015).  She  has a past surgical history that includes primary c section (1988).    Family History   Problem Relation Age of Onset   • Cancer Mother         pancreatic   • Cancer Father         liver   • Lung Disease Paternal Grandmother         asthma   • Diabetes Neg Hx    • Hypertension Neg Hx    • Heart Disease Neg Hx    • Stroke Neg Hx        Social History     Social History   • Marital status:      Spouse name: N/A   • Number of children: N/A   • Years of education: N/A     Occupational History   • Not on file.     Social History Main Topics   • Smoking status: Never Smoker   • Smokeless tobacco: Never Used   • Alcohol use Yes      Comment: once a month   • Drug use: No   • Sexual activity: Yes     Partners: Male     Birth control/ protection: Condom     Other Topics Concern   • Not on file     Social History Narrative   • No narrative on file       Patient Active Problem List    Diagnosis Date Noted   • Dyslipidemia 07/10/2017   • Perimenopausal symptoms 07/17/2012         Current Outpatient Prescriptions   Medication Sig Dispense Refill   • atorvastatin (LIPITOR) 20 MG Tab Take 1 Tab by mouth every bedtime. 90 Tab 1   • Nutritional Supplements (WOMENS HEALTH SUPPORT PO) Take  by mouth.     • aspirin EC (ECOTRIN) 81 MG Tablet Delayed Response Take 1 Tab by mouth every day. 30 Tab    • CALCIUM PO Take 600 mg by mouth every day.     • Multiple Vitamins-Minerals (MULTIVITAL PO) Take  by mouth.     • GARLIC Take 1,000 Caps by  "mouth every day.     • vitamin E (VITAMIN E) 1000 UNIT CAPS Take 1 Cap by mouth every day. For skin      • Acetaminophen (TYLENOL ARTHRITIS PAIN PO) Take  by mouth.     • albuterol (PROAIR HFA) 108 (90 BASE) MCG/ACT AERS inhalation aerosol Inhale 2 Puffs by mouth every four hours as needed for Shortness of Breath. 1 Inhaler 0   • Multiple Vitamins-Minerals (HAIR/SKIN/NAILS PO) Take 1 Cap by mouth every day.       No current facility-administered medications for this visit.      No Known Allergies    Review of Systems   Constitutional: Negative for fever, chills and malaise/fatigue.   HENT: Negative for congestion.    Eyes: Negative for pain.   Respiratory: Negative for cough. + shortness of breath. Typically during times of stress. Would like another PFT as she thinks she may need Spiriva.  Cardiovascular: Negative for leg swelling.   Gastrointestinal: Negative for nausea, vomiting, abdominal pain and diarrhea.   Genitourinary: Negative for dysuria and hematuria.   Skin: Negative for rash.   Neurological: Negative for dizziness, focal weakness and headaches.   Endo/Heme/Allergies: Does not bruise/bleed easily.   Psychiatric/Behavioral: Negative for depression.  The patient is not nervous/anxious.      Objective:     Vitals:    10/30/18 1105   BP: 118/80   BP Location: Left arm   Patient Position: Sitting   BP Cuff Size: Adult   Pulse: 69   Temp: 36.2 °C (97.2 °F)   TempSrc: Temporal   SpO2: 97%   Weight: 61.5 kg (135 lb 9.6 oz)   Height: 1.626 m (5' 4\")     Body mass index is 23.28 kg/m².   Wt Readings from Last 4 Encounters:   10/30/18 61.5 kg (135 lb 9.6 oz)   03/19/18 63 kg (139 lb)   01/15/18 64.9 kg (143 lb)   07/10/17 62.4 kg (137 lb 9.6 oz)       Physical Exam:  Constitutional: Well-developed and well-nourished. Not diaphoretic. No distress.   Skin: Skin is warm and dry. No rash noted.  Head: Atraumatic without lesions.  Eyes: Conjunctivae and extraocular motions are normal. Pupils are equal, round, and " reactive to light. No scleral icterus.   Ears:  External ears unremarkable. Tympanic membranes clear and intact.  Nose: Nares patent. Septum midline. Turbinates without erythema nor edema. No discharge.   Mouth/Throat: Tongue normal. Oropharynx is clear and moist. Posterior pharynx without erythema or exudates.  Neck: Supple, trachea midline. Normal range of motion. No thyromegaly present. No carotid bruits. No JVD. No lymphadenopathy--cervical or supraclavicular  Cardiovascular: Regular rate and rhythm, S1 and S2 without murmur, rubs, or gallops.    Chest: Effort normal. Clear to auscultation throughout. No adventitious sounds. No CVA tenderness.  Abdomen: Soft, non tender, and without distention. Active bowel sounds in all four quadrants. No rebound, guarding, masses or HSM.  Extremities: No erythema or edema.  Musculoskeletal: All major joints AROM full in all directions without pain.  Neurological: Alert and oriented x 3.   Psychiatric:  Behavior, mood, and affect are appropriate.      Reviewed labs from [facility] into media from 9/10/18  Assessment and Plan:     1. Well adult exam -Pt counseled about skin care, diet, supplements, and exercise.    2. Dyslipidemia -Stable. Continue current medicines. Monitor labs regularly. atorvastatin (LIPITOR) 20 MG Tab   3. Shortness of breath -pulmonary function test ordered for further evaluation. Likely secondary to anxiety--will continue to monitor PULMONARY FUNCTION TESTS -Test requested: Spirometry with-out & with Bronchodilator   4. Need for vaccination -VIS given. Informed consent obtained. Vaccine administered in office. Influenza Vaccine Quad Injection >3Y (PF)   - reprinted lab req for patient to complete in April.    HCM:  Shingrix unavailable.  Follow-up with GYN for pap.    Follow-up: Return in about 6 months (around 4/30/2019) for lab review, sooner if needed.

## 2018-11-14 PROBLEM — R92.2 DENSE BREAST: Status: ACTIVE | Noted: 2018-11-14

## 2018-11-14 PROBLEM — R92.30 DENSE BREAST: Status: ACTIVE | Noted: 2018-11-14

## 2018-12-27 ENCOUNTER — APPOINTMENT (OUTPATIENT)
Dept: PULMONOLOGY | Facility: MEDICAL CENTER | Age: 58
End: 2018-12-27
Payer: COMMERCIAL

## 2019-01-28 ENCOUNTER — HOSPITAL ENCOUNTER (OUTPATIENT)
Dept: RADIOLOGY | Facility: MEDICAL CENTER | Age: 59
End: 2019-01-28

## 2019-01-30 ENCOUNTER — APPOINTMENT (OUTPATIENT)
Dept: RADIOLOGY | Facility: MEDICAL CENTER | Age: 59
End: 2019-01-30
Attending: PHYSICIAN ASSISTANT
Payer: COMMERCIAL

## 2019-03-19 ENCOUNTER — TELEPHONE (OUTPATIENT)
Dept: MEDICAL GROUP | Age: 59
End: 2019-03-19

## 2019-03-19 DIAGNOSIS — E78.5 DYSLIPIDEMIA: ICD-10-CM

## 2019-03-19 RX ORDER — ATORVASTATIN CALCIUM 20 MG/1
20 TABLET, FILM COATED ORAL
Qty: 90 TAB | Refills: 4 | Status: SHIPPED | OUTPATIENT
Start: 2019-03-19 | End: 2020-06-17

## 2019-07-30 LAB
CHOLEST SERPL-MCNC: 138 MG/DL
HDLC SERPL-MCNC: 54 MG/DL
LDLC SERPL CALC-MCNC: 69 MG/DL
TRIGL SERPL-MCNC: 69 MG/DL

## 2019-11-04 ENCOUNTER — OFFICE VISIT (OUTPATIENT)
Dept: MEDICAL GROUP | Age: 59
End: 2019-11-04
Payer: COMMERCIAL

## 2019-11-04 VITALS
HEIGHT: 64 IN | WEIGHT: 134 LBS | BODY MASS INDEX: 22.88 KG/M2 | TEMPERATURE: 98.2 F | SYSTOLIC BLOOD PRESSURE: 112 MMHG | OXYGEN SATURATION: 98 % | DIASTOLIC BLOOD PRESSURE: 76 MMHG | HEART RATE: 92 BPM

## 2019-11-04 DIAGNOSIS — M62.838 NECK MUSCLE SPASM: ICD-10-CM

## 2019-11-04 DIAGNOSIS — Z23 NEED FOR VACCINATION: ICD-10-CM

## 2019-11-04 DIAGNOSIS — G44.209 TENSION HEADACHE: ICD-10-CM

## 2019-11-04 DIAGNOSIS — Z12.31 ENCOUNTER FOR SCREENING MAMMOGRAM FOR BREAST CANCER: ICD-10-CM

## 2019-11-04 DIAGNOSIS — E78.5 DYSLIPIDEMIA: ICD-10-CM

## 2019-11-04 DIAGNOSIS — M79.604 RIGHT LEG PAIN: ICD-10-CM

## 2019-11-04 DIAGNOSIS — Z11.59 NEED FOR HEPATITIS C SCREENING TEST: ICD-10-CM

## 2019-11-04 PROCEDURE — 90686 IIV4 VACC NO PRSV 0.5 ML IM: CPT | Performed by: PHYSICIAN ASSISTANT

## 2019-11-04 PROCEDURE — 90471 IMMUNIZATION ADMIN: CPT | Performed by: PHYSICIAN ASSISTANT

## 2019-11-04 PROCEDURE — 99214 OFFICE O/P EST MOD 30 MIN: CPT | Mod: 25 | Performed by: PHYSICIAN ASSISTANT

## 2019-11-04 RX ORDER — CYCLOBENZAPRINE HCL 5 MG
5-10 TABLET ORAL 3 TIMES DAILY PRN
Qty: 30 TAB | Refills: 4 | Status: SHIPPED | OUTPATIENT
Start: 2019-11-04 | End: 2020-12-01 | Stop reason: SDUPTHER

## 2019-11-04 SDOH — HEALTH STABILITY: MENTAL HEALTH: HOW OFTEN DO YOU HAVE A DRINK CONTAINING ALCOHOL?: MONTHLY OR LESS

## 2019-11-04 SDOH — HEALTH STABILITY: MENTAL HEALTH: HOW MANY STANDARD DRINKS CONTAINING ALCOHOL DO YOU HAVE ON A TYPICAL DAY?: 1 OR 2

## 2019-11-04 SDOH — HEALTH STABILITY: MENTAL HEALTH: HOW OFTEN DO YOU HAVE 6 OR MORE DRINKS ON ONE OCCASION?: NEVER

## 2019-11-04 ASSESSMENT — PATIENT HEALTH QUESTIONNAIRE - PHQ9: CLINICAL INTERPRETATION OF PHQ2 SCORE: 0

## 2019-11-04 NOTE — PROGRESS NOTES
Subjective:     Chief Complaint   Patient presents with   • Leg Pain     (R) x 1 month   • Dyslipidemia   • Flu Vaccine   • Eye Pain     2 X per week (R) Side     Rylie Trent is a 59 y.o. female here today for evaluation and management of:    Dyslipidemia  Stable. Currently taking atorvastatin 20 mg nightly with baby aspirin as directed.  Denies side effects--no myalgias or abdominal pain.  Reports diet is clean--really working on cleaning up diet for weight loss health purposes  She is exercising regularly.  She denies dizziness, claudication, or chest pain.    Right leg pain  New problem.  Reports she does a lot of eli.   A week ago she states she had a pain in her right glute every day. Describes pain as a stabbing 5/10 pain that was constant.  Typically lays on right side but had to change sides and that helped.  She remembers a specific episode where she forcefully flexed her hip and felt a strain in her glute  Aleve helped relieve pain.  No pain today.  Wondering if she could have a refill on muscle relaxers she received in the past.  She is stretching.  She denies numbness, tingling, or weakness in her legs. She denies saddle paresthesias or loss of urinary/bowel control.     Tension HA/trapezius muscle spasm  New problem.  Reports she is under a lot of stress.  States that she is caring for her grandchildren a lot of days of the week and on days that she is watching them she finds that she has a pain behind her right eye.  Describes it as a deep pressure.  She recently saw eye doctor and they found no problems.    She reports she really only has the pain on days she is watching the kids.  States it feels like a tight band around her head.  She is left-hand dominant.  Muscle spasm on right side of her neck/shoulder  No numbness or tingling in her hands, arms, or face    ROS   Denies any recent fevers or chills. No nausea or vomiting. No diarrhea. No chest pains or shortness of breath. No lower  "extremity edema.    No Known Allergies    Current medicines (including changes today)  Current Outpatient Medications   Medication Sig Dispense Refill   • cyclobenzaprine (FLEXERIL) 5 MG tablet Take 1-2 Tabs by mouth 3 times a day as needed for Muscle Spasms. 30 Tab 4   • atorvastatin (LIPITOR) 20 MG Tab Take 1 Tab by mouth every bedtime. 90 Tab 4   • Nutritional Supplements (WOMENS HEALTH SUPPORT PO) Take  by mouth.     • aspirin EC (ECOTRIN) 81 MG Tablet Delayed Response Take 1 Tab by mouth every day. 30 Tab    • Acetaminophen (TYLENOL ARTHRITIS PAIN PO) Take  by mouth.     • CALCIUM PO Take 600 mg by mouth every day.     • Multiple Vitamins-Minerals (HAIR/SKIN/NAILS PO) Take 1 Cap by mouth every day.     • Multiple Vitamins-Minerals (MULTIVITAL PO) Take  by mouth.     • GARLIC Take 1,000 Caps by mouth every day.     • vitamin E (VITAMIN E) 1000 UNIT CAPS Take 1 Cap by mouth every day. For skin        No current facility-administered medications for this visit.        Patient Active Problem List    Diagnosis Date Noted   • Dense breast 11/14/2018   • Dyslipidemia 07/10/2017   • Perimenopausal symptoms 07/17/2012       Family History   Problem Relation Age of Onset   • Cancer Mother         pancreatic   • Cancer Father         liver   • Lung Disease Paternal Grandmother         asthma   • Diabetes Neg Hx    • Hypertension Neg Hx    • Heart Disease Neg Hx    • Stroke Neg Hx           Objective:     Vitals:    11/04/19 0829   BP: 112/76   BP Location: Right arm   Patient Position: Sitting   BP Cuff Size: Adult   Pulse: 92   Temp: 36.8 °C (98.2 °F)   TempSrc: Temporal   SpO2: 98%   Weight: 60.8 kg (134 lb)   Height: 1.626 m (5' 4\")     Body mass index is 23 kg/m².     Physical Exam:  Gen: Well developed, well nourished in no acute distress.   Skin: Pink, warm, and dry  HEENT: conjunctiva non-injected, sclera non-icteric. EOMs intact.   Neck: Supple, trachea midline. No adenopathy or masses in the neck or " supraclavicular regions.  Trapezius muscle spasm noted on right side.  Neck active range of motion is limited with lateral flexion to the left.  Lungs: Effort is normal. Clear to auscultation bilaterally with good excursion.  CV: regular rate and rhythm.  Abdomen: soft, nontender, + BS.  Ext: no edema, color normal, vascularity normal, temperature normal  Alert and oriented Eye contact is good, speech goal directed, affect calm    Component      Latest Ref Rng & Units 7/30/2019          12:00 AM   Cholesterol,Tot       138   Triglycerides       69   HDL       54   LDL       69   Glucose 90  Creatinine 0.72  AST 23; ALT 19    Reviewed and discussed above labs with patient in office.      Assessment and Plan:   The following treatment plan was discussed:     1. Dyslipidemia  Stable. Continue current medicines. Monitor labs regularly.  - Comp Metabolic Panel; Future  - Lipid Profile; Future    2. Right leg pain  -- Performed and demonstrated stretches in office with patient. Will do at minimum BID.  - Discussed importance of ice x 20 minutes at least BID to help reduce inflammation    3. Tension headache  4. Neck muscle spasm  - Performed and demonstrated stretches in office with patient. Will do at minimum BID.  - Discussed importance of ice x 20 minutes at least BID to help reduce inflammation  - cyclobenzaprine (FLEXERIL) 5 MG tablet; Take 1-2 Tabs by mouth 3 times a day as needed for Muscle Spasms.  Dispense: 30 Tab; Refill: 4    5. Need for hepatitis C screening test  - HEP C VIRUS ANTIBODY; Future    6. Need for vaccination  VIS given. Informed consent obtained. Vaccine administered in office.  - Influenza Vaccine Quad Injection (PF)    7. Encounter for screening mammogram for breast cancer  Educated on importance of mammograms for breast cancer screening and current recommendations. Mammogram ordered.  - MA-SCREEN MAMMO W/CAD-BILAT; Future    Health Maintenance: Shingrix unavailable.     Followup: Return in  about 5 months (around 4/4/2020) for lab review, sooner if needed.         This dictation was created using voice recognition software. The accuracy of the dictation is limited to the abilities of the software. I expect there may be some errors of grammar and possibly content.

## 2020-06-15 ENCOUNTER — OFFICE VISIT (OUTPATIENT)
Dept: MEDICAL GROUP | Age: 60
End: 2020-06-15
Payer: COMMERCIAL

## 2020-06-15 VITALS
SYSTOLIC BLOOD PRESSURE: 108 MMHG | WEIGHT: 135.2 LBS | OXYGEN SATURATION: 98 % | TEMPERATURE: 97.9 F | BODY MASS INDEX: 23.08 KG/M2 | HEIGHT: 64 IN | HEART RATE: 63 BPM | DIASTOLIC BLOOD PRESSURE: 72 MMHG

## 2020-06-15 DIAGNOSIS — E78.5 DYSLIPIDEMIA: ICD-10-CM

## 2020-06-15 DIAGNOSIS — R92.30 DENSE BREAST: ICD-10-CM

## 2020-06-15 DIAGNOSIS — F41.9 ANXIETY: ICD-10-CM

## 2020-06-15 DIAGNOSIS — Z00.00 WELL ADULT EXAM: ICD-10-CM

## 2020-06-15 DIAGNOSIS — R92.2 DENSE BREAST: ICD-10-CM

## 2020-06-15 PROCEDURE — 99396 PREV VISIT EST AGE 40-64: CPT | Performed by: PHYSICIAN ASSISTANT

## 2020-06-15 RX ORDER — THIAMINE HCL 100 MG
TABLET ORAL DAILY
COMMUNITY

## 2020-06-15 ASSESSMENT — PATIENT HEALTH QUESTIONNAIRE - PHQ9: CLINICAL INTERPRETATION OF PHQ2 SCORE: 0
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
## 2020-06-15 NOTE — LETTER
[facility]  Omaira Gonsalves P.A.-C.  [address]  [place] [address]  Fax: [phone]   Authorization for Release/Disclosure of   Protected Health Information   Name: JOVITA TRENT : 1960 SSN: [id number]   Address: [facility] [address] [place] [address] Phone:    [phone] (home)    I authorize the entity listed below to release/disclose the PHI below to:   [facility]/Omaira Gonsalves P.A.-C. and Omaira Gonsalves P.A.-C.   Provider or Entity Name:  [facility]   Address   City, State, Zip   Phone:      Fax:     Reason for request: continuity of care   Information to be released:    [  ] LAST COLONOSCOPY,  including any PATH REPORT and follow-up  [  ] LAST FIT/COLOGUARD RESULT [  ] LAST DEXA  [X ] LAST MAMMOGRAM  [  ] LAST PAP  [  ] LAST LABS [  ] RETINA EXAM REPORT  [  ] IMMUNIZATION RECORDS  [  ] Release all info      [  ] Check here and initial the line next to each item to release ALL health information INCLUDING  _____ Care and treatment for drug and / or alcohol abuse  _____ HIV testing, infection status, or AIDS  _____ Genetic Testing    DATES OF SERVICE OR TIME PERIOD TO BE DISCLOSED: _____________  I understand and acknowledge that:  * This Authorization may be revoked at any time by you in writing, except if your health information has already been used or disclosed.  * Your health information that will be used or disclosed as a result of you signing this authorization could be re-disclosed by the recipient. If this occurs, your re-disclosed health information may no longer be protected by State or Federal laws.  * You may refuse to sign this Authorization. Your refusal will not affect your ability to obtain treatment.  * This Authorization becomes effective upon signing and will  on (date) __________.      If no date is indicated, this Authorization will  one (1) year from the signature date.    Name: Jovita Trent    Signature:   Date:     6/15/2020       PLEASE FAX REQUESTED  RECORDS BACK TO: [phone]

## 2020-06-15 NOTE — LETTER
[facility]  Omaira Gonsalves P.A.-C.  [address]  [place] [address]  Fax: [phone]   Authorization for Release/Disclosure of   Protected Health Information   Name: JOVITA TRENT : 1960 SSN: [id number]   Address: [address] [place] [address] Phone:    [phone] (home)    I authorize the entity listed below to release/disclose the PHI below to:   [facility]/Omaira Gonsalves P.A.-C. and Omaira Gonsalves P.A.-C.   Provider or Entity Name:  [facility]   Address   City, State, Zip   Phone:      Fax:     Reason for request: continuity of care   Information to be released:    [  ] LAST COLONOSCOPY,  including any PATH REPORT and follow-up  [  ] LAST FIT/COLOGUARD RESULT [  ] LAST DEXA  [  ] LAST MAMMOGRAM  [X] LAST PAP  [  ] LAST LABS [  ] RETINA EXAM REPORT  [  ] IMMUNIZATION RECORDS  [  ] Release all info      [  ] Check here and initial the line next to each item to release ALL health information INCLUDING  _____ Care and treatment for drug and / or alcohol abuse  _____ HIV testing, infection status, or AIDS  _____ Genetic Testing    DATES OF SERVICE OR TIME PERIOD TO BE DISCLOSED: _____________  I understand and acknowledge that:  * This Authorization may be revoked at any time by you in writing, except if your health information has already been used or disclosed.  * Your health information that will be used or disclosed as a result of you signing this authorization could be re-disclosed by the recipient. If this occurs, your re-disclosed health information may no longer be protected by State or Federal laws.  * You may refuse to sign this Authorization. Your refusal will not affect your ability to obtain treatment.  * This Authorization becomes effective upon signing and will  on (date) __________.      If no date is indicated, this Authorization will  one (1) year from the signature date.    Name: Jovita Trent    Signature:   Date:     6/15/2020       PLEASE FAX REQUESTED  RECORDS BACK TO: [phone]

## 2020-06-17 DIAGNOSIS — E78.5 DYSLIPIDEMIA: ICD-10-CM

## 2020-06-17 RX ORDER — ATORVASTATIN CALCIUM 20 MG/1
TABLET, FILM COATED ORAL
Qty: 90 TAB | Refills: 3 | Status: SHIPPED | OUTPATIENT
Start: 2020-06-17 | End: 2021-05-24 | Stop reason: SDUPTHER

## 2020-11-30 ENCOUNTER — PATIENT MESSAGE (OUTPATIENT)
Dept: MEDICAL GROUP | Age: 60
End: 2020-11-30

## 2020-11-30 DIAGNOSIS — M62.838 NECK MUSCLE SPASM: ICD-10-CM

## 2020-12-01 RX ORDER — CYCLOBENZAPRINE HCL 5 MG
5-10 TABLET ORAL 3 TIMES DAILY PRN
Qty: 30 TAB | Refills: 1 | Status: SHIPPED | OUTPATIENT
Start: 2020-12-01 | End: 2021-04-27 | Stop reason: SDUPTHER

## 2021-01-14 ENCOUNTER — PATIENT MESSAGE (OUTPATIENT)
Dept: MEDICAL GROUP | Age: 61
End: 2021-01-14

## 2021-03-20 ENCOUNTER — IMMUNIZATION (OUTPATIENT)
Dept: FAMILY PLANNING/WOMEN'S HEALTH CLINIC | Facility: IMMUNIZATION CENTER | Age: 61
End: 2021-03-20
Payer: COMMERCIAL

## 2021-03-20 DIAGNOSIS — Z23 ENCOUNTER FOR VACCINATION: Primary | ICD-10-CM

## 2021-03-20 PROCEDURE — 91300 PFIZER SARS-COV-2 VACCINE: CPT

## 2021-03-20 PROCEDURE — 0001A PFIZER SARS-COV-2 VACCINE: CPT

## 2021-04-08 DIAGNOSIS — E78.5 DYSLIPIDEMIA: ICD-10-CM

## 2021-04-13 ENCOUNTER — HOSPITAL ENCOUNTER (OUTPATIENT)
Dept: RADIOLOGY | Facility: MEDICAL CENTER | Age: 61
End: 2021-04-13
Attending: PHYSICIAN ASSISTANT
Payer: COMMERCIAL

## 2021-04-13 ENCOUNTER — OFFICE VISIT (OUTPATIENT)
Dept: MEDICAL GROUP | Age: 61
End: 2021-04-13
Payer: COMMERCIAL

## 2021-04-13 VITALS
DIASTOLIC BLOOD PRESSURE: 78 MMHG | TEMPERATURE: 97.8 F | SYSTOLIC BLOOD PRESSURE: 126 MMHG | BODY MASS INDEX: 23.35 KG/M2 | HEART RATE: 68 BPM | OXYGEN SATURATION: 99 % | WEIGHT: 136.8 LBS | HEIGHT: 64 IN

## 2021-04-13 DIAGNOSIS — R06.02 SHORTNESS OF BREATH: ICD-10-CM

## 2021-04-13 DIAGNOSIS — E78.5 DYSLIPIDEMIA: ICD-10-CM

## 2021-04-13 DIAGNOSIS — S99.921A INJURY OF TOE ON RIGHT FOOT, INITIAL ENCOUNTER: ICD-10-CM

## 2021-04-13 DIAGNOSIS — F41.9 ANXIETY: ICD-10-CM

## 2021-04-13 DIAGNOSIS — J45.20 MILD INTERMITTENT ASTHMA WITHOUT COMPLICATION: ICD-10-CM

## 2021-04-13 DIAGNOSIS — G44.229 CHRONIC TENSION-TYPE HEADACHE, NOT INTRACTABLE: ICD-10-CM

## 2021-04-13 PROCEDURE — 71046 X-RAY EXAM CHEST 2 VIEWS: CPT

## 2021-04-13 PROCEDURE — 99215 OFFICE O/P EST HI 40 MIN: CPT | Performed by: PHYSICIAN ASSISTANT

## 2021-04-13 RX ORDER — ALBUTEROL SULFATE 90 UG/1
2 AEROSOL, METERED RESPIRATORY (INHALATION) EVERY 4 HOURS PRN
Qty: 8 G | Refills: 3 | Status: SHIPPED | OUTPATIENT
Start: 2021-04-13

## 2021-04-13 ASSESSMENT — PATIENT HEALTH QUESTIONNAIRE - PHQ9
5. POOR APPETITE OR OVEREATING: 0 - NOT AT ALL
CLINICAL INTERPRETATION OF PHQ2 SCORE: 2
SUM OF ALL RESPONSES TO PHQ QUESTIONS 1-9: 3
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
## 2021-04-16 ENCOUNTER — IMMUNIZATION (OUTPATIENT)
Dept: FAMILY PLANNING/WOMEN'S HEALTH CLINIC | Facility: IMMUNIZATION CENTER | Age: 61
End: 2021-04-16
Attending: INTERNAL MEDICINE
Payer: COMMERCIAL

## 2021-04-16 DIAGNOSIS — Z23 ENCOUNTER FOR VACCINATION: Primary | ICD-10-CM

## 2021-04-16 PROCEDURE — 91300 PFIZER SARS-COV-2 VACCINE: CPT | Performed by: INTERNAL MEDICINE

## 2021-04-16 PROCEDURE — 0002A PFIZER SARS-COV-2 VACCINE: CPT | Performed by: INTERNAL MEDICINE

## 2021-04-27 ENCOUNTER — OFFICE VISIT (OUTPATIENT)
Dept: MEDICAL GROUP | Age: 61
End: 2021-04-27
Payer: COMMERCIAL

## 2021-04-27 VITALS
WEIGHT: 138 LBS | HEIGHT: 64 IN | DIASTOLIC BLOOD PRESSURE: 72 MMHG | TEMPERATURE: 97.9 F | HEART RATE: 73 BPM | OXYGEN SATURATION: 97 % | SYSTOLIC BLOOD PRESSURE: 118 MMHG | BODY MASS INDEX: 23.56 KG/M2

## 2021-04-27 DIAGNOSIS — R10.13 EPIGASTRIC PAIN: ICD-10-CM

## 2021-04-27 DIAGNOSIS — M62.838 NECK MUSCLE SPASM: ICD-10-CM

## 2021-04-27 DIAGNOSIS — R13.14 PHARYNGOESOPHAGEAL DYSPHAGIA: ICD-10-CM

## 2021-04-27 DIAGNOSIS — K44.9 HIATAL HERNIA: ICD-10-CM

## 2021-04-27 PROCEDURE — 99214 OFFICE O/P EST MOD 30 MIN: CPT | Performed by: PHYSICIAN ASSISTANT

## 2021-04-27 RX ORDER — CYCLOBENZAPRINE HCL 5 MG
5-10 TABLET ORAL 3 TIMES DAILY PRN
Qty: 30 TABLET | Refills: 0 | Status: SHIPPED | OUTPATIENT
Start: 2021-04-27 | End: 2021-06-23

## 2021-04-27 RX ORDER — OMEPRAZOLE 20 MG/1
20 TABLET, DELAYED RELEASE ORAL DAILY
Qty: 30 TABLET | Refills: 0 | Status: SHIPPED | OUTPATIENT
Start: 2021-04-27

## 2021-04-27 NOTE — PATIENT INSTRUCTIONS
Hiatal Hernia    A hiatal hernia occurs when part of the stomach slides above the muscle that separates the abdomen from the chest (diaphragm). A person can be born with a hiatal hernia (congenital), or it may develop over time. In almost all cases of hiatal hernia, only the top part of the stomach pushes through the diaphragm.  Many people have a hiatal hernia with no symptoms. The larger the hernia, the more likely it is that you will have symptoms. In some cases, a hiatal hernia allows stomach acid to flow back into the tube that carries food from your mouth to your stomach (esophagus). This may cause heartburn symptoms. Severe heartburn symptoms may mean that you have developed a condition called gastroesophageal reflux disease (GERD).  What are the causes?  This condition is caused by a weakness in the opening (hiatus) where the esophagus passes through the diaphragm to attach to the upper part of the stomach. A person may be born with a weakness in the hiatus, or a weakness can develop over time.  What increases the risk?  This condition is more likely to develop in:  · Older people. Age is a major risk factor for a hiatal hernia, especially if you are over the age of 50.  · Pregnant women.  · People who are overweight.  · People who have frequent constipation.  What are the signs or symptoms?  Symptoms of this condition usually develop in the form of GERD symptoms. Symptoms include:  · Heartburn.  · Belching.  · Indigestion.  · Trouble swallowing.  · Coughing or wheezing.  · Sore throat.  · Hoarseness.  · Chest pain.  · Nausea and vomiting.  How is this diagnosed?  This condition may be diagnosed during testing for GERD. Tests that may be done include:  · X-rays of your stomach or chest.  · An upper gastrointestinal (GI) series. This is an X-ray exam of your GI tract that is taken after you swallow a chalky liquid that shows up clearly on the X-ray.  · Endoscopy. This is a procedure to look into your stomach  using a thin, flexible tube that has a tiny camera and light on the end of it.  How is this treated?  This condition may be treated by:  · Dietary and lifestyle changes to help reduce GERD symptoms.  · Medicines. These may include:  ? Over-the-counter antacids.  ? Medicines that make your stomach empty more quickly.  ? Medicines that block the production of stomach acid (H2 blockers).  ? Stronger medicines to reduce stomach acid (proton pump inhibitors).  · Surgery to repair the hernia, if other treatments are not helping.  If you have no symptoms, you may not need treatment.  Follow these instructions at home:  Lifestyle and activity  · Do not use any products that contain nicotine or tobacco, such as cigarettes and e-cigarettes. If you need help quitting, ask your health care provider.  · Try to achieve and maintain a healthy body weight.  · Avoid putting pressure on your abdomen. Anything that puts pressure on your abdomen increases the amount of acid that may be pushed up into your esophagus.  ? Avoid bending over, especially after eating.  ? Raise the head of your bed by putting blocks under the legs. This keeps your head and esophagus higher than your stomach.  ? Do not wear tight clothing around your chest or stomach.  ? Try not to strain when having a bowel movement, when urinating, or when lifting heavy objects.  Eating and drinking  · Avoid foods that can worsen GERD symptoms. These may include:  ? Fatty foods, like fried foods.  ? Citrus fruits, like oranges or lemon.  ? Other foods and drinks that contain acid, like orange juice or tomatoes.  ? Spicy food.  ? Chocolate.  · Eat frequent small meals instead of three large meals a day. This helps prevent your stomach from getting too full.  ? Eat slowly.  ? Do not lie down right after eating.  ? Do not eat 1-2 hours before bed.  · Do not drink beverages with caffeine. These include cola, coffee, cocoa, and tea.  · Do not drink alcohol.  General  instructions  · Take over-the-counter and prescription medicines only as told by your health care provider.  · Keep all follow-up visits as told by your health care provider. This is important.  Contact a health care provider if:  · Your symptoms are not controlled with medicines or lifestyle changes.  · You are having trouble swallowing.  · You have coughing or wheezing that will not go away.  Get help right away if:  · Your pain is getting worse.  · Your pain spreads to your arms, neck, jaw, teeth, or back.  · You have shortness of breath.  · You sweat for no reason.  · You feel sick to your stomach (nauseous) or you vomit.  · You vomit blood.  · You have bright red blood in your stools.  · You have black, tarry stools.  This information is not intended to replace advice given to you by your health care provider. Make sure you discuss any questions you have with your health care provider.  Document Released: 03/09/2005 Document Revised: 11/30/2018 Document Reviewed: 07/23/2018  Elsevier Patient Education © 2020 [employer]

## 2021-04-27 NOTE — PROGRESS NOTES
"  Subjective:     Chief Complaint   Patient presents with   • Gastrophageal Reflux   • Orders Needed     Pt requesting Endoscopy     Rylie Trent is a 60 y.o. female here today for evaluation and management of:    dysphagia  Reports it feels like things are getting stuck in her throat but getting caught in throat. Occurs even with water.   Reports epigastric pain for years-- endoscopy in [place] in 2015 showed hiatal hernia.  Omeprazole 20 mg for the last week; resolved epigastric pain and thinks maybe helped with difficulty swallowing    Asthma  Reports severe asthma as a kid--was often in hospital receiving treatments. Was around smoke a lot in [place]. Reports asthma \"went away\" when she was 23--when [place]  Has heard back regarding PFT--will follow-up.    Neck muscle spasm  Reports taking flexeril, stretching and relaxing--helping with neck spasm, headaches and face pain. Feeling better.     Shortness of breath improved with relaxation and discomfort in chest.      Current medicines (including changes today)  Current Outpatient Medications   Medication Sig Dispense Refill   • cyclobenzaprine (FLEXERIL) 5 mg tablet Take 1-2 Tablets by mouth 3 times a day as needed for Muscle Spasms. 30 tablet 0   • omeprazole (PRILOSEC OTC) 20 MG tablet Take 1 tablet by mouth every day. 30 tablet 0   • albuterol (PROAIR HFA) 108 (90 Base) MCG/ACT Aero Soln inhalation aerosol Inhale 2 Puffs every four hours as needed for Shortness of Breath. 8 g 3   • atorvastatin (LIPITOR) 20 MG Tab TAKE 1 TABLET BY MOUTH EVERY DAY AT BEDTIME 90 Tab 3   • Magnesium 500 MG Tab Take  by mouth every day.     • Nutritional Supplements (WOMENS HEALTH SUPPORT PO) Take  by mouth.     • aspirin EC (ECOTRIN) 81 MG Tablet Delayed Response Take 1 Tab by mouth every day. 30 Tab    • Acetaminophen (TYLENOL ARTHRITIS PAIN PO) Take  by mouth.     • CALCIUM PO Take 600 mg by mouth every day.     • Multiple Vitamins-Minerals (HAIR/SKIN/NAILS PO) Take 1 " "Cap by mouth every day.     • Multiple Vitamins-Minerals (MULTIVITAL PO) Take  by mouth.     • GARLIC Take 1,000 Caps by mouth every day.     • vitamin E (VITAMIN E) 1000 UNIT CAPS Take 1 Cap by mouth every day. For skin        No current facility-administered medications for this visit.        Objective:     Vitals:    04/27/21 0931   BP: 118/72   BP Location: Left arm   Patient Position: Sitting   BP Cuff Size: Adult   Pulse: 73   Temp: 36.6 °C (97.9 °F)   TempSrc: Temporal   SpO2: 97%   Weight: 62.6 kg (138 lb)   Height: 1.626 m (5' 4\")     Body mass index is 23.69 kg/m².     Physical Exam:  Constitutional: Alert, no distress, well-groomed.  Skin: Warm, dry, good turgor, no rashes in visible areas.  Eye: Equal, round and reactive, conjunctiva clear, lids normal.  Neck: Trachea midline, no masses, no thyromegaly.  Cardiovascular: Regular rate and rhythm.  Chest: Effort normal. Clear to auscultation throughout. No adventitious sounds.   Abdomen: Soft, no gross masses.  MSK: Normal gait, moves all extremities.  Neuro: Grossly non-focal. No cranial nerve deficit. Strength and sensation intact.   Psych: Alert and oriented x3, normal affect and mood.       Assessment and Plan:   The following treatment plan was discussed:     1. Pharyngoesophageal dysphagia  Chronic, potentially related to hiatal hernia and lack of treatment, however, warrants EGD. Past EGD in [place]. Will send her over to GI for further evaluation. If symptoms resolve with Omeprazole may delay consult.  - REFERRAL TO GASTROENTEROLOGY    2. Epigastric pain  3. Hiatal hernia  Chronic, hiatal hernia per [place] EGD about 6 years ago. Reports improvement in epigastric pain with one week of Prilosec. Will continue for a 1 month treatment.  . If symptoms do not fully resolve, she will definitely schedule with GI. If symptoms resolve but then return after 1 month of omeprazole will also go to GI.  -  avoid common reflux triggers such as spicy, greasy foods, " peppers, onions. Avoid caffeine, carbonation, alcohol,.  - pt provided with additional education material on AVS   - omeprazole (PRILOSEC OTC) 20 MG tablet; Take 1 tablet by mouth every day.    Dispense: 30 tablet; Refill: 0  - REFERRAL TO GASTROENTEROLOGY    4. Neck muscle spasm  Chronic, improved with treatment. Continue flexeril and stretches. Discussed continuation of stress reduction techniques to help with tension and shortness of breath.   Still need her to complete PFT--information given to call scheduling.   - cyclobenzaprine (FLEXERIL) 5 mg tablet; Take 1-2 Tablets by mouth 3 times a day as needed for Muscle Spasms.  Dispense: 30 tablet; Refill: 0    Health Maintenance: Up-to-date     Followup: Return if symptoms worsen or fail to improve.

## 2021-05-15 ENCOUNTER — HOSPITAL ENCOUNTER (OUTPATIENT)
Dept: PULMONOLOGY | Facility: MEDICAL CENTER | Age: 61
End: 2021-05-15
Attending: PHYSICIAN ASSISTANT
Payer: COMMERCIAL

## 2021-05-15 PROCEDURE — 94726 PLETHYSMOGRAPHY LUNG VOLUMES: CPT

## 2021-05-15 PROCEDURE — 94060 EVALUATION OF WHEEZING: CPT

## 2021-05-15 PROCEDURE — 94729 DIFFUSING CAPACITY: CPT

## 2021-05-15 RX ORDER — ALBUTEROL SULFATE 90 UG/1
2 AEROSOL, METERED RESPIRATORY (INHALATION)
Status: DISCONTINUED | OUTPATIENT
Start: 2021-05-15 | End: 2021-05-16 | Stop reason: HOSPADM

## 2021-05-15 ASSESSMENT — PULMONARY FUNCTION TESTS
FEV1/FVC_PERCENT_CHANGE: 2
FVC_PERCENT_PREDICTED: 107
FVC_PERCENT_PREDICTED: 108
FEV1_LLN: 2.06
FEV1/FVC_PERCENT_PREDICTED: 89
FEV1: 2.42
FEV1_PERCENT_CHANGE: 1
FEV1_PREDICTED: 2.47
FEV1/FVC_PREDICTED: 79.22
FEV1/FVC: 70.97
FEV1_PERCENT_CHANGE: 3
FEV1/FVC_PERCENT_LLN: 66.15
FEV1_PERCENT_PREDICTED: 97
FEV1_LLN: 2.06
FEV1/FVC_PERCENT_PREDICTED: 79
FEV1/FVC_PERCENT_CHANGE: 300
FVC: 3.41
FEV1/FVC_PERCENT_PREDICTED: 90
FVC_LLN: 2.62
FEV1/FVC_PERCENT_PREDICTED: 88
FEV1_PERCENT_PREDICTED: 94
FVC_LLN: 2.62
FEV1/FVC: 70.94
FVC_PREDICTED: 3.14
FEV1/FVC: 69.22
FEV1/FVC_PERCENT_LLN: 66.15
FVC: 3.36
FEV1: 2.32
FEV1/FVC_PERCENT_PREDICTED: 87
FEV1/FVC: 69

## 2021-05-16 PROCEDURE — 94726 PLETHYSMOGRAPHY LUNG VOLUMES: CPT | Mod: 26 | Performed by: INTERNAL MEDICINE

## 2021-05-16 PROCEDURE — 94060 EVALUATION OF WHEEZING: CPT | Mod: 26 | Performed by: INTERNAL MEDICINE

## 2021-05-16 PROCEDURE — 94729 DIFFUSING CAPACITY: CPT | Mod: 26 | Performed by: INTERNAL MEDICINE

## 2021-05-16 NOTE — PROCEDURES
DATE OF SERVICE:  05/14/2021     PULMONARY FUNCTION TEST INTERPRETATION      The patient gave good effort and the results of this test did meet the ATS   standards for acceptability and repeatability.  The patient was unable to   perform plethysmography testing accurately.     SPIROMETRY:  The patient's FEV1/FVC ratio was 69%, which is consistent with   obstruction.  Her postbronchodilator FEV1 was 97% of predicted.  This is   consistent with mild obstructive lung disease.  There was no bronchodilator   response.     LUNG VOLUMES:  I note that the patient was unable to perform the   plethysmographic testing accurately.  Her residual volume and a total   lung capacity resported are likely inaccurate.     DIFFUSION CAPACITY:  The patient's diffusion capacity was 89% of predicted and   was normal.     SUMMARY:  The patient had very mild obstructive defect and has no positive   bronchodilator response.  The lung volumes were not accurately done and please   consider repeating them if clinically indicated.        ______________________________  MD LIBRADO Kimbrough/LORETA    DD:  05/16/2021 14:40  DT:  05/16/2021 15:41    Job#:  [id number]

## 2021-06-23 DIAGNOSIS — M62.838 NECK MUSCLE SPASM: ICD-10-CM

## 2021-06-24 RX ORDER — CYCLOBENZAPRINE HCL 5 MG
TABLET ORAL
Qty: 90 TABLET | Refills: 1 | Status: SHIPPED | OUTPATIENT
Start: 2021-06-24 | End: 2021-12-06

## 2021-12-06 DIAGNOSIS — M62.838 NECK MUSCLE SPASM: ICD-10-CM

## 2021-12-06 RX ORDER — CYCLOBENZAPRINE HCL 5 MG
TABLET ORAL
Qty: 90 TABLET | Refills: 0 | Status: SHIPPED | OUTPATIENT
Start: 2021-12-06

## 2024-04-10 ENCOUNTER — OFFICE VISIT (OUTPATIENT)
Dept: MEDICAL GROUP | Facility: LAB | Age: 64
End: 2024-04-10
Payer: COMMERCIAL

## 2024-04-10 VITALS
OXYGEN SATURATION: 99 % | HEIGHT: 64 IN | HEART RATE: 77 BPM | TEMPERATURE: 97.2 F | DIASTOLIC BLOOD PRESSURE: 66 MMHG | SYSTOLIC BLOOD PRESSURE: 122 MMHG | BODY MASS INDEX: 23.34 KG/M2 | RESPIRATION RATE: 16 BRPM | WEIGHT: 136.69 LBS

## 2024-04-10 DIAGNOSIS — R92.30 DENSE BREAST: ICD-10-CM

## 2024-04-10 DIAGNOSIS — M19.049 ARTHRITIS OF HAND: ICD-10-CM

## 2024-04-10 DIAGNOSIS — Z11.4 ENCOUNTER FOR SCREENING FOR HIV: ICD-10-CM

## 2024-04-10 DIAGNOSIS — Z01.84 IMMUNITY STATUS TESTING: ICD-10-CM

## 2024-04-10 DIAGNOSIS — Z13.29 SCREENING FOR THYROID DISORDER: ICD-10-CM

## 2024-04-10 DIAGNOSIS — Z13.1 DIABETES MELLITUS SCREENING: ICD-10-CM

## 2024-04-10 DIAGNOSIS — Z12.31 ENCOUNTER FOR SCREENING MAMMOGRAM FOR MALIGNANT NEOPLASM OF BREAST: ICD-10-CM

## 2024-04-10 DIAGNOSIS — E78.5 DYSLIPIDEMIA: ICD-10-CM

## 2024-04-10 DIAGNOSIS — Z76.89 ENCOUNTER TO ESTABLISH CARE: ICD-10-CM

## 2024-04-10 DIAGNOSIS — R21 SKIN ERUPTION: ICD-10-CM

## 2024-04-10 DIAGNOSIS — J45.20 MILD INTERMITTENT CHILDHOOD ASTHMA WITHOUT COMPLICATION: ICD-10-CM

## 2024-04-10 DIAGNOSIS — M62.838 NECK MUSCLE SPASM: ICD-10-CM

## 2024-04-10 PROBLEM — J45.909 CHILDHOOD ASTHMA WITHOUT COMPLICATION: Status: ACTIVE | Noted: 2024-04-10

## 2024-04-10 PROBLEM — J45.909 CHILDHOOD ASTHMA WITHOUT COMPLICATION: Status: RESOLVED | Noted: 2024-04-10 | Resolved: 2024-04-10

## 2024-04-10 PROCEDURE — 3078F DIAST BP <80 MM HG: CPT | Performed by: PHYSICIAN ASSISTANT

## 2024-04-10 PROCEDURE — 3074F SYST BP LT 130 MM HG: CPT | Performed by: PHYSICIAN ASSISTANT

## 2024-04-10 PROCEDURE — 99214 OFFICE O/P EST MOD 30 MIN: CPT | Performed by: PHYSICIAN ASSISTANT

## 2024-04-10 RX ORDER — CYCLOBENZAPRINE HCL 5 MG
TABLET ORAL
Qty: 90 TABLET | Refills: 0 | Status: SHIPPED | OUTPATIENT
Start: 2024-04-10

## 2024-04-10 RX ORDER — TRIAMCINOLONE ACETONIDE 1 MG/G
1 CREAM TOPICAL 2 TIMES DAILY
Qty: 45 G | Refills: 0 | Status: SHIPPED | OUTPATIENT
Start: 2024-04-10 | End: 2024-04-24

## 2024-04-10 RX ORDER — MELOXICAM 7.5 MG/1
7.5 TABLET ORAL 2 TIMES DAILY PRN
Qty: 60 TABLET | Refills: 0 | Status: SHIPPED | OUTPATIENT
Start: 2024-04-10 | End: 2024-05-10

## 2024-04-10 ASSESSMENT — PATIENT HEALTH QUESTIONNAIRE - PHQ9: CLINICAL INTERPRETATION OF PHQ2 SCORE: 0

## 2024-04-10 NOTE — LETTER
Critical access hospital  Tiffanie Barrera P.A.-C.  [address] [facility] [address]  [place] [address]  Fax: [phone]   Authorization for Release/Disclosure of   Protected Health Information   Name: JOVITA ROY : 1960 SSN: [id number]   Address: [address] [place] [address]  [place] [address] Phone:    [phone] (home)    I authorize the entity listed below to release/disclose the PHI below to:   Critical access hospital/Tiffanie Barrera P.A.-C. and Tiffanie Barrera P.A.-C.   Provider or Entity Name:  [facility]   Address   City, State, Zip   Phone:      Fax:     Reason for request: continuity of care   Information to be released:    [  ] LAST COLONOSCOPY,  including any PATH REPORT and follow-up  [  ] LAST FIT/COLOGUARD RESULT [  ] LAST DEXA  [ x ] LAST MAMMOGRAM  [  ] LAST PAP  [  ] LAST LABS [  ] RETINA EXAM REPORT  [  ] IMMUNIZATION RECORDS  [  ] Release all info      [  ] Check here and initial the line next to each item to release ALL health information INCLUDING  _____ Care and treatment for drug and / or alcohol abuse  _____ HIV testing, infection status, or AIDS  _____ Genetic Testing    DATES OF SERVICE OR TIME PERIOD TO BE DISCLOSED: _____________  I understand and acknowledge that:  * This Authorization may be revoked at any time by you in writing, except if your health information has already been used or disclosed.  * Your health information that will be used or disclosed as a result of you signing this authorization could be re-disclosed by the recipient. If this occurs, your re-disclosed health information may no longer be protected by State or Federal laws.  * You may refuse to sign this Authorization. Your refusal will not affect your ability to obtain treatment.  * This Authorization becomes effective upon signing and will  on (date) __________.      If no date is indicated, this Authorization will  one (1) year from the signature date.    Name: Jovita Roy  Signature:  Date:   4/10/2024     PLEASE FAX REQUESTED RECORDS BACK TO: [phone]

## 2024-04-10 NOTE — PROGRESS NOTES
Subjective:     CC:  There were no encounter diagnoses.    HISTORY OF THE PRESENT ILLNESS: Patient is a 63 y.o. female. This pleasant patient is here today to establish care.    Anxiety symptoms  -Patient reports history of panic attacks, shortness of breath associated with acute stressors, particularly riding the car when other people are driving.     GERD  Previous eval with ENT/GI with endoscopy.  Previously prescribed omeprazole 20 mg daily.  Symptoms are currently well-controlled at this time except for occasional cough or throat clearing    Arthritis  Patient works at [employer].  She is predominantly left-handed.  She notes early morning stiffness pain in the joints of the fingers.  The pain does seem to improve with increased movement or activity.  She is tried over-the-counter NSAIDs with some improvement of symptoms.  Denies any recent imaging of the hands.  Denies any recent injuries to the hands    Skin eruption  Patient reports occasional episodes of dry, itchy skin particularly with exposure to very hot water.  Denies new lotions or toiletries    Health Maintenance     - Dyslipidemia (30-45): ordered  - Diabetes (HTN, HLD, BMI >25): ordered  - Depression screening (PHQ-2 and/or PHQ-9): neg  - Dental: UTD  - Eye: UTD  Diet: regular  Exercise: active at work  Substance Use: denies  Tobacco Use/counseling: denies  Safe in relationship: yes  Seat belts, bike helmet, gun safety discussed.  Sun protection used.       Cancer screening  - Colon CA (45-75) - FIT (annual) cspy (q10yr): due 2026  - Cervical CA (21-65): records requsted  -  HX Abnormal pap/HPV: none  - Breast CA: mammo (required 50-73yo) or starting 40 (ACOG, ACR), 45 (ACS), 50 (USPTF): ordered     Infectious disease screening/Immunizations  --HIV Screening: ordered  --Hepatitis C Screening (18 to 80 yo): completed  --Immunizations: Hep B titers ordered    Current Outpatient Medications Ordered in Epic   Medication Sig Dispense  "Refill    cyclobenzaprine (FLEXERIL) 5 mg tablet TAKE 1 TO 2 TABLETS BY MOUTH THREE TIMES DAILY AS NEEDED FOR MUSCLE SPASM 90 Tablet 0    atorvastatin (LIPITOR) 20 MG Tab Take 1 tablet by mouth at bedtime. 90 tablet 3    omeprazole (PRILOSEC OTC) 20 MG tablet Take 1 tablet by mouth every day. 30 tablet 0    albuterol (PROAIR HFA) 108 (90 Base) MCG/ACT Aero Soln inhalation aerosol Inhale 2 Puffs every four hours as needed for Shortness of Breath. 8 g 3    Magnesium 500 MG Tab Take  by mouth every day.      Nutritional Supplements (WOMENS HEALTH SUPPORT PO) Take  by mouth.      aspirin EC (ECOTRIN) 81 MG Tablet Delayed Response Take 1 Tab by mouth every day. 30 Tab     Acetaminophen (TYLENOL ARTHRITIS PAIN PO) Take  by mouth.      CALCIUM PO Take 600 mg by mouth every day.      Multiple Vitamins-Minerals (HAIR/SKIN/NAILS PO) Take 1 Cap by mouth every day.      Multiple Vitamins-Minerals (MULTIVITAL PO) Take  by mouth.      GARLIC Take 1,000 Caps by mouth every day.      vitamin E (VITAMIN E) 1000 UNIT CAPS Take 1 Cap by mouth every day. For skin        No current Epic-ordered facility-administered medications on file.       ROS:   Gen: no fevers/chills, no changes in weight  Eyes: no changes in vision  ENT: no sore throat, no hearing loss, no bloody nose  Pulm: no sob, no cough  CV: no chest pain, no palpitations  GI: no nausea/vomiting, no diarrhea  : no dysuria  MSk: no myalgias  Skin: no rash  Neuro: no headaches, no numbness/tingling  Heme/Lymph: no easy bruising      Objective:     Exam: /66 (BP Location: Left arm, Patient Position: Sitting, BP Cuff Size: Adult)   Pulse 77   Temp 36.2 °C (97.2 °F) (Temporal)   Resp 16   Ht 1.626 m (5' 4\")   Wt 62 kg (136 lb 11 oz)   SpO2 99%  Body mass index is 23.46 kg/m².    General: Normal appearing. No distress.  HEENT: Normocephalic. Eyes conjunctiva clear lids without ptosis, pupils equal and reactive to light accommodation, ears normal shape and contour, " canals are clear bilaterally, tympanic membranes are benign, nasal mucosa benign, oropharynx is without erythema, edema or exudates.   Neck: Supple without JVD or bruit. Thyroid is not enlarged.  Pulmonary: Clear to ausculation.  Normal effort. No rales, ronchi, or wheezing.  Cardiovascular: Regular rate and rhythm without murmur. Carotid and radial pulses are intact and equal bilaterally.  Abdomen: Soft, nontender, nondistended. Normal bowel sounds. Liver and spleen are not palpable  Neurologic: Grossly nonfocal  Lymph: No cervical or supraclavicular lymph nodes are palpable  Skin: Warm and dry.  No obvious lesions.  Musculoskeletal: Normal gait. No extremity cyanosis, clubbing, or edema.  Psych: Normal mood and affect. Alert and oriented x3. Judgment and insight is normal.  Hand/Wrist Musculoskeletal Exam    Inspection    Right      Deformity comment: Thickened nodularities noted over the joints primarily of the second through fourth digits     Left      Deformity comment: Thickened nodularities noted over the joints primarily of the second through fourth digits    Range of Motion    Right Hand      Right hand range of motion is normal.      Left Hand      Left hand range of motion is normal.         Strength    Right Hand      Right hand strength is normal.      Left Hand      Left hand strength is normal.              Assessment & Plan:   63 y.o. female with the following -    1. Encounter to establish care  Labs per orders  Vaccinations per orders  Screenings per orders      2. Arthritis of hand  Chronic condition, new to me  Physical exam is significant for thickening/nodules over mostly joints prickly DIP of the second through fourth digits bilaterally.  Symptoms have responded in the past over-the-counter NSAIDs.  Will trial meloxicam 7.5 mg twice daily as needed.  Will also proceed with an some updated x-rays of the joints.  If symptoms do not respond to conservative measures could also consider referral to  Ortho for further evaluation   - DX-JOINT SURVEY-HANDS SINGLE VIEW; Future    3. Skin eruption  chronic condition, new to me  Skin presentation appears consistent with some xerosis, particularly exacerbated by hot water.  Advised washing/bathing with warm rather than hot water.  Use of unscented emollient lotions and avoiding skin irritants.  Will trial topical steroid for as needed use.  Advised against prolonged usage of this medication as it can cause skin thinning over time  - triamcinolone acetonide (KENALOG) 0.1 % Cream; Apply 1 Application topically 2 times a day for 14 days.  Dispense: 45 g; Refill: 0    4. Dyslipidemia  Chronic condition, new to me  Due for updated labs  Previous prescribed Lipitor 20 mg nightly, not currently taking this medication.  Do anticipate resuming this medication pending lab results  - CBC WITH DIFFERENTIAL; Future  - Comp Metabolic Panel; Future  - Lipid Profile; Future    5. Neck muscle spasm  Chronic condition, stable  - cyclobenzaprine (FLEXERIL) 5 mg tablet; TAKE 1 TO 2 TABLETS BY MOUTH THREE TIMES DAILY AS NEEDED FOR MUSCLE SPASM  Dispense: 90 Tablet; Refill: 0  - meloxicam (MOBIC) 7.5 MG Tab; Take 1 Tablet by mouth 2 times a day as needed for Inflammation for up to 30 days.  Dispense: 60 Tablet; Refill: 0    6. Mild intermittent childhood asthma without complication  Prior condition, stable  Continue albuterol as needed    7. Screening for thyroid disorder  - TSH WITH REFLEX TO FT4; Future    8. Encounter for screening mammogram for malignant neoplasm of breast  - MA-SCREENING MAMMO BILAT W/TOMOSYNTHESIS W/CAD; Future    9. Dense breast  - US-SCREENING WHOLE BREAST BILATERAL (3D SCREENING); Future    10. Diabetes mellitus screening  - HEMOGLOBIN A1C; Future    11. Encounter for screening for HIV  - HIV AG/AB COMBO ASSAY SCREENING; Future    12. Immunity status testing  - HEP B CORE AB TOTAL; Future  - HEP B SURFACE AB; Future  - HEP B SURFACE ANTIGEN; Future     I spent a  total of 36 minutes with record review, exam, communication with the patient, communication with other providers, and documentation of this encounter.    No follow-ups on file.    Please note that this dictation was created using voice recognition software. I have made every reasonable attempt to correct obvious errors, but I expect that there are errors of grammar and possibly content that I did not discover before finalizing the note.

## 2024-04-10 NOTE — LETTER
[facility]  Tiffanie Barrera P.A.-C.  [address]  [place] [address]  Fax: [phone]   Authorization for Release/Disclosure of   Protected Health Information   Name: JOVITA ROY : 1960 SSN: [id number]   Address: [address] [place] [address]  [place] [address] Phone:    [phone] (home)    I authorize the entity listed below to release/disclose the PHI below to:   [facility]/Tiffanie Barrera P.A.-C. and Tiffanie Barrera P.A.-C.   Provider or Entity Name:  Dr Chung   [facility]  [address], [place] [address] Phone:  [phone]    Fax:     Reason for request: continuity of care   Information to be released:    [  ] LAST COLONOSCOPY,  including any PATH REPORT and follow-up  [  ] LAST FIT/COLOGUARD RESULT [  ] LAST DEXA  [  ] LAST MAMMOGRAM  [ x ] LAST PAP  [  ] LAST LABS [  ] RETINA EXAM REPORT  [  ] IMMUNIZATION RECORDS  [  ] Release all info      [  ] Check here and initial the line next to each item to release ALL health information INCLUDING  _____ Care and treatment for drug and / or alcohol abuse  _____ HIV testing, infection status, or AIDS  _____ Genetic Testing    DATES OF SERVICE OR TIME PERIOD TO BE DISCLOSED: _____________  I understand and acknowledge that:  * This Authorization may be revoked at any time by you in writing, except if your health information has already been used or disclosed.  * Your health information that will be used or disclosed as a result of you signing this authorization could be re-disclosed by the recipient. If this occurs, your re-disclosed health information may no longer be protected by State or Federal laws.  * You may refuse to sign this Authorization. Your refusal will not affect your ability to obtain treatment.  * This Authorization becomes effective upon signing and will  on (date) __________.      If no date is indicated, this Authorization will  one (1) year from the signature date.     Name: Rylie Rodrigues Twan  Signature: Date:   4/10/2024     PLEASE FAX REQUESTED RECORDS BACK TO: [phone]

## 2024-04-19 ENCOUNTER — HOSPITAL ENCOUNTER (OUTPATIENT)
Dept: LAB | Facility: MEDICAL CENTER | Age: 64
End: 2024-04-19
Attending: PHYSICIAN ASSISTANT
Payer: COMMERCIAL

## 2024-04-19 DIAGNOSIS — Z13.1 DIABETES MELLITUS SCREENING: ICD-10-CM

## 2024-04-19 DIAGNOSIS — Z11.4 ENCOUNTER FOR SCREENING FOR HIV: ICD-10-CM

## 2024-04-19 DIAGNOSIS — Z13.29 SCREENING FOR THYROID DISORDER: ICD-10-CM

## 2024-04-19 DIAGNOSIS — E78.5 DYSLIPIDEMIA: ICD-10-CM

## 2024-04-19 DIAGNOSIS — Z01.84 IMMUNITY STATUS TESTING: ICD-10-CM

## 2024-04-19 LAB
ALBUMIN SERPL BCP-MCNC: 4.6 G/DL (ref 3.2–4.9)
ALBUMIN/GLOB SERPL: 1.8 G/DL
ALP SERPL-CCNC: 112 U/L (ref 30–99)
ALT SERPL-CCNC: 9 U/L (ref 2–50)
ANION GAP SERPL CALC-SCNC: 11 MMOL/L (ref 7–16)
AST SERPL-CCNC: 18 U/L (ref 12–45)
BASOPHILS # BLD AUTO: 1.3 % (ref 0–1.8)
BASOPHILS # BLD: 0.06 K/UL (ref 0–0.12)
BILIRUB SERPL-MCNC: 0.4 MG/DL (ref 0.1–1.5)
BUN SERPL-MCNC: 15 MG/DL (ref 8–22)
CALCIUM ALBUM COR SERPL-MCNC: 8.6 MG/DL (ref 8.5–10.5)
CALCIUM SERPL-MCNC: 9.1 MG/DL (ref 8.5–10.5)
CHLORIDE SERPL-SCNC: 103 MMOL/L (ref 96–112)
CHOLEST SERPL-MCNC: 237 MG/DL (ref 100–199)
CO2 SERPL-SCNC: 25 MMOL/L (ref 20–33)
CREAT SERPL-MCNC: 0.59 MG/DL (ref 0.5–1.4)
EOSINOPHIL # BLD AUTO: 0.19 K/UL (ref 0–0.51)
EOSINOPHIL NFR BLD: 4 % (ref 0–6.9)
ERYTHROCYTE [DISTWIDTH] IN BLOOD BY AUTOMATED COUNT: 42.8 FL (ref 35.9–50)
EST. AVERAGE GLUCOSE BLD GHB EST-MCNC: 123 MG/DL
GFR SERPLBLD CREATININE-BSD FMLA CKD-EPI: 100 ML/MIN/1.73 M 2
GLOBULIN SER CALC-MCNC: 2.6 G/DL (ref 1.9–3.5)
GLUCOSE SERPL-MCNC: 95 MG/DL (ref 65–99)
HBA1C MFR BLD: 5.9 % (ref 4–5.6)
HBV CORE AB SERPL QL IA: NONREACTIVE
HBV SURFACE AB SERPL IA-ACNC: 1621 MIU/ML (ref 0–10)
HBV SURFACE AG SER QL: NORMAL
HCT VFR BLD AUTO: 42.4 % (ref 37–47)
HDLC SERPL-MCNC: 51 MG/DL
HGB BLD-MCNC: 13.4 G/DL (ref 12–16)
HIV 1+2 AB+HIV1 P24 AG SERPL QL IA: NORMAL
IMM GRANULOCYTES # BLD AUTO: 0.01 K/UL (ref 0–0.11)
IMM GRANULOCYTES NFR BLD AUTO: 0.2 % (ref 0–0.9)
LDLC SERPL CALC-MCNC: 167 MG/DL
LYMPHOCYTES # BLD AUTO: 1.94 K/UL (ref 1–4.8)
LYMPHOCYTES NFR BLD: 40.6 % (ref 22–41)
MCH RBC QN AUTO: 27.5 PG (ref 27–33)
MCHC RBC AUTO-ENTMCNC: 31.6 G/DL (ref 32.2–35.5)
MCV RBC AUTO: 86.9 FL (ref 81.4–97.8)
MONOCYTES # BLD AUTO: 0.33 K/UL (ref 0–0.85)
MONOCYTES NFR BLD AUTO: 6.9 % (ref 0–13.4)
NEUTROPHILS # BLD AUTO: 2.25 K/UL (ref 1.82–7.42)
NEUTROPHILS NFR BLD: 47 % (ref 44–72)
NRBC # BLD AUTO: 0 K/UL
NRBC BLD-RTO: 0 /100 WBC (ref 0–0.2)
PLATELET # BLD AUTO: 236 K/UL (ref 164–446)
PMV BLD AUTO: 10.6 FL (ref 9–12.9)
POTASSIUM SERPL-SCNC: 4.7 MMOL/L (ref 3.6–5.5)
PROT SERPL-MCNC: 7.2 G/DL (ref 6–8.2)
RBC # BLD AUTO: 4.88 M/UL (ref 4.2–5.4)
SODIUM SERPL-SCNC: 139 MMOL/L (ref 135–145)
TRIGL SERPL-MCNC: 97 MG/DL (ref 0–149)
TSH SERPL DL<=0.005 MIU/L-ACNC: 1.86 UIU/ML (ref 0.38–5.33)
WBC # BLD AUTO: 4.8 K/UL (ref 4.8–10.8)

## 2024-04-19 PROCEDURE — 87340 HEPATITIS B SURFACE AG IA: CPT

## 2024-04-19 PROCEDURE — 84443 ASSAY THYROID STIM HORMONE: CPT

## 2024-04-19 PROCEDURE — 36415 COLL VENOUS BLD VENIPUNCTURE: CPT

## 2024-04-19 PROCEDURE — 80053 COMPREHEN METABOLIC PANEL: CPT

## 2024-04-19 PROCEDURE — 85025 COMPLETE CBC W/AUTO DIFF WBC: CPT

## 2024-04-19 PROCEDURE — 86706 HEP B SURFACE ANTIBODY: CPT

## 2024-04-19 PROCEDURE — 80061 LIPID PANEL: CPT

## 2024-04-19 PROCEDURE — 87389 HIV-1 AG W/HIV-1&-2 AB AG IA: CPT

## 2024-04-19 PROCEDURE — 83036 HEMOGLOBIN GLYCOSYLATED A1C: CPT

## 2024-04-19 PROCEDURE — 86704 HEP B CORE ANTIBODY TOTAL: CPT

## 2024-05-02 ENCOUNTER — HOSPITAL ENCOUNTER (OUTPATIENT)
Dept: RADIOLOGY | Facility: MEDICAL CENTER | Age: 64
End: 2024-05-02
Attending: PHYSICIAN ASSISTANT
Payer: COMMERCIAL

## 2024-05-02 DIAGNOSIS — M19.049 ARTHRITIS OF HAND: ICD-10-CM

## 2024-05-03 DIAGNOSIS — M15.4 EROSIVE (OSTEO)ARTHRITIS: ICD-10-CM

## 2024-05-06 DIAGNOSIS — M62.838 NECK MUSCLE SPASM: ICD-10-CM

## 2024-05-07 RX ORDER — MELOXICAM 7.5 MG/1
TABLET ORAL
Qty: 60 TABLET | Refills: 0 | Status: SHIPPED | OUTPATIENT
Start: 2024-05-07

## 2024-09-04 ENCOUNTER — PATIENT MESSAGE (OUTPATIENT)
Dept: HEALTH INFORMATION MANAGEMENT | Facility: OTHER | Age: 64
End: 2024-09-04

## 2024-10-17 ENCOUNTER — TELEPHONE (OUTPATIENT)
Dept: HEALTH INFORMATION MANAGEMENT | Facility: OTHER | Age: 64
End: 2024-10-17

## 2024-11-11 ENCOUNTER — TELEPHONE (OUTPATIENT)
Dept: HEALTH INFORMATION MANAGEMENT | Facility: OTHER | Age: 64
End: 2024-11-11